# Patient Record
Sex: FEMALE | Race: BLACK OR AFRICAN AMERICAN | NOT HISPANIC OR LATINO
[De-identification: names, ages, dates, MRNs, and addresses within clinical notes are randomized per-mention and may not be internally consistent; named-entity substitution may affect disease eponyms.]

---

## 2017-07-10 ENCOUNTER — APPOINTMENT (OUTPATIENT)
Dept: PULMONOLOGY | Facility: CLINIC | Age: 52
End: 2017-07-10

## 2018-02-02 ENCOUNTER — APPOINTMENT (OUTPATIENT)
Dept: NEUROLOGY | Facility: CLINIC | Age: 53
End: 2018-02-02
Payer: MEDICAID

## 2018-02-02 VITALS
DIASTOLIC BLOOD PRESSURE: 80 MMHG | BODY MASS INDEX: 39.37 KG/M2 | WEIGHT: 245 LBS | SYSTOLIC BLOOD PRESSURE: 130 MMHG | HEIGHT: 66 IN

## 2018-02-02 DIAGNOSIS — Z87.19 PERSONAL HISTORY OF OTHER DISEASES OF THE DIGESTIVE SYSTEM: ICD-10-CM

## 2018-02-02 DIAGNOSIS — Z86.39 PERSONAL HISTORY OF OTHER ENDOCRINE, NUTRITIONAL AND METABOLIC DISEASE: ICD-10-CM

## 2018-02-02 DIAGNOSIS — F17.200 NICOTINE DEPENDENCE, UNSPECIFIED, UNCOMPLICATED: ICD-10-CM

## 2018-02-02 PROCEDURE — 99204 OFFICE O/P NEW MOD 45 MIN: CPT

## 2018-03-14 ENCOUNTER — APPOINTMENT (OUTPATIENT)
Dept: NEUROLOGY | Facility: CLINIC | Age: 53
End: 2018-03-14
Payer: MEDICAID

## 2018-03-14 VITALS
WEIGHT: 245 LBS | DIASTOLIC BLOOD PRESSURE: 68 MMHG | HEIGHT: 66 IN | BODY MASS INDEX: 39.37 KG/M2 | SYSTOLIC BLOOD PRESSURE: 120 MMHG

## 2018-03-14 PROCEDURE — 99214 OFFICE O/P EST MOD 30 MIN: CPT

## 2018-09-12 ENCOUNTER — APPOINTMENT (OUTPATIENT)
Dept: NEUROLOGY | Facility: CLINIC | Age: 53
End: 2018-09-12

## 2018-11-07 ENCOUNTER — APPOINTMENT (OUTPATIENT)
Dept: NEUROLOGY | Facility: CLINIC | Age: 53
End: 2018-11-07
Payer: MEDICAID

## 2018-11-07 VITALS
DIASTOLIC BLOOD PRESSURE: 68 MMHG | HEIGHT: 66 IN | BODY MASS INDEX: 35.36 KG/M2 | WEIGHT: 220 LBS | SYSTOLIC BLOOD PRESSURE: 128 MMHG

## 2018-11-07 PROCEDURE — 99213 OFFICE O/P EST LOW 20 MIN: CPT

## 2018-11-07 NOTE — ASSESSMENT
[FreeTextEntry1] : This is a 53-year-old woman with a history of lower back pains. She has seen a spinal specialist for this.\par \par She also has symptoms of carpal tunnel syndrome for which she has been wearing wrist braces at night.\par She has seen an orthopedist for this and reports that she was told that surgery was an option if the pain becomes unbearable.\par \par She also has some mild findings of neuropathy on nerve conduction studies.\par I advised her to maintain good blood sugar control. She reports that her last glycohemoglobin was 5.5.\par \par I will see her back in 6 months.

## 2018-11-07 NOTE — HISTORY OF PRESENT ILLNESS
[FreeTextEntry1] : I saw this patient in the office today.\par \par As you recall, she described a long history of lower back pain and sciatica on the right side. \par This has been going on for many years. It had become worse and began to radiate down the right leg into the foot.\par It is quite severe.\par It is associated with some numbness and tingling in the right leg.\par She reports that occasionally she will notice improvement if she changes position.\par She reports that she went for pain management injection and has had good relief.\par \par She also has had symptoms of carpal tunnel syndrome in both hands for the past several years. This had been somewhat worse and she has seen an orthopedic specialist for it.\par \par She was found to have neuropathy on nerve conduction study.\par \par

## 2018-11-07 NOTE — PHYSICAL EXAM
[General Appearance - Alert] : alert [Oriented To Time, Place, And Person] : oriented to person, place, and time [Memory Recent] : recent memory was not impaired [Memory Remote] : remote memory was not impaired [Cranial Nerves Optic (II)] : visual acuity intact bilaterally,  visual fields full to confrontation, pupils equal round and reactive to light [Cranial Nerves Oculomotor (III)] : extraocular motion intact [Cranial Nerves Trigeminal (V)] : facial sensation intact symmetrically [Cranial Nerves Facial (VII)] : face symmetrical [Cranial Nerves Vestibulocochlear (VIII)] : hearing was intact bilaterally [Cranial Nerves Glossopharyngeal (IX)] : tongue and palate midline [Cranial Nerves Accessory (XI - Cranial And Spinal)] : head turning and shoulder shrug symmetric [Cranial Nerves Hypoglossal (XII)] : there was no tongue deviation with protrusion [Motor Tone] : muscle tone was normal in all four extremities [Motor Strength] : muscle strength was normal in all four extremities [Sensation Tactile Decrease] : light touch was intact [Sensation Pain / Temperature Decrease] : pain and temperature was intact [Sensation Vibration Decrease] : vibration was intact [Abnormal Walk] : normal gait [1+] : Patella left 1+ [Aphasia] : no dysphasia/aphasia [Romberg's Sign] : Romberg's sign was negtive [Coordination - Dysmetria Impaired Finger-to-Nose Bilateral] : not present [Plantar Reflex Right Only] : normal on the right [Plantar Reflex Left Only] : normal on the left [FreeTextEntry8] : Tinel sign is present at the wrists bilaterally. [FreeTextEntry1] : There is some lumbar paraspinal tenderness bilaterally.

## 2018-11-07 NOTE — DATA REVIEWED
[de-identified] : Nerve conduction studies demonstrated some peripheral neuropathy likely secondary to her diabetes.\par \par MRI of the lumbar spine was performed at [facility] on 2/5/18. \par The study demonstrated multilevel degenerative changes and disc bulges.\par

## 2018-11-07 NOTE — REASON FOR VISIT
[Follow-Up: _____] : a [unfilled] follow-up visit [FreeTextEntry1] : CC: lower back pain and carpal tunnel syndrome

## 2018-11-07 NOTE — CONSULT LETTER
[Dear  ___] : Dear  [unfilled], [Courtesy Letter:] : I had the pleasure of seeing your patient, [unfilled], in my office today. [Please see my note below.] : Please see my note below. [Consult Closing:] : Thank you very much for allowing me to participate in the care of this patient.  If you have any questions, please do not hesitate to contact me. [Sincerely,] : Sincerely, [FreeTextEntry3] : Joshua Ferraro MD.

## 2018-11-07 NOTE — REVIEW OF SYSTEMS
[As Noted in HPI] : as noted in HPI [Palpitations] : palpitations [Joint Pain] : joint pain [Negative] : Heme/Lymph

## 2019-05-08 ENCOUNTER — APPOINTMENT (OUTPATIENT)
Dept: NEUROLOGY | Facility: CLINIC | Age: 54
End: 2019-05-08
Payer: MEDICAID

## 2019-05-08 VITALS
WEIGHT: 220 LBS | HEIGHT: 66 IN | SYSTOLIC BLOOD PRESSURE: 120 MMHG | BODY MASS INDEX: 35.36 KG/M2 | DIASTOLIC BLOOD PRESSURE: 65 MMHG

## 2019-05-08 PROCEDURE — 99214 OFFICE O/P EST MOD 30 MIN: CPT

## 2019-05-08 NOTE — ASSESSMENT
[FreeTextEntry1] : This is a 53 year-old woman with a history of lower back pains. She has seen a spinal specialist for this.\par \par She also has symptoms of carpal tunnel syndrome for which she has been wearing wrist braces at night.\par She has seen an orthopedist for this and reports that she was told that surgery was an option if the pain becomes unbearable.\par \par She also has some mild findings of neuropathy on nerve conduction studies.\par I advised her to maintain good blood sugar control. \par \par She had an episode of syncope and a small lacunar infarct was seen on brain MRI.\par Likely this is secondary to small vessel disease.\par I would recommend she continue antiplatelet statin medication.\par \par I will see her back in 6 months.

## 2019-05-08 NOTE — HISTORY OF PRESENT ILLNESS
[FreeTextEntry1] : I saw this patient in the office today.\par \par As you recall, she described a long history of lower back pain and sciatica on the right side. \par This has been going on for many years. It had become worse and began to radiate down the right leg into the foot.\par It is quite severe.\par It is associated with some numbness and tingling in the right leg.\par She reports that occasionally she will notice improvement if she changes position.\par She reports that she went for pain management injection and has had good relief.\par \par She also has had symptoms of carpal tunnel syndrome in both hands for the past several years. This had been somewhat worse and she has seen an orthopedic specialist for it.\par \par She was found to have neuropathy on nerve conduction study.\par \par She now reports that in early January of 2019 she passed out briefly.\par She has had no further episodes.\par She was sent for an MRI of the brain.\par She was noted to have a small lacunar infarct that appeared chronic.

## 2019-05-08 NOTE — DATA REVIEWED
[de-identified] : Brain MRI was performed on 2/13/19 at [facility].\par The study demonstrated a chronic lacunar infarct in the left superior frontal region.\par There is also some chronic ischemic change. [de-identified] : Nerve conduction studies demonstrated some peripheral neuropathy likely secondary to her diabetes.\par \par MRI of the lumbar spine was performed at [facility] on 2/5/18. \par The study demonstrated multilevel degenerative changes and disc bulges.\par

## 2019-05-08 NOTE — PHYSICAL EXAM
[General Appearance - In No Acute Distress] : in no acute distress [General Appearance - Alert] : alert [Oriented To Time, Place, And Person] : oriented to person, place, and time [Affect] : the affect was normal [Memory Recent] : recent memory was not impaired [Memory Remote] : remote memory was not impaired [Cranial Nerves Optic (II)] : visual acuity intact bilaterally,  visual fields full to confrontation, pupils equal round and reactive to light [Cranial Nerves Oculomotor (III)] : extraocular motion intact [Cranial Nerves Trigeminal (V)] : facial sensation intact symmetrically [Cranial Nerves Vestibulocochlear (VIII)] : hearing was intact bilaterally [Cranial Nerves Facial (VII)] : face symmetrical [Cranial Nerves Accessory (XI - Cranial And Spinal)] : head turning and shoulder shrug symmetric [Cranial Nerves Glossopharyngeal (IX)] : tongue and palate midline [Cranial Nerves Hypoglossal (XII)] : there was no tongue deviation with protrusion [Motor Strength] : muscle strength was normal in all four extremities [Motor Tone] : muscle tone was normal in all four extremities [Sensation Pain / Temperature Decrease] : pain and temperature was intact [Sensation Tactile Decrease] : light touch was intact [Abnormal Walk] : normal gait [Sensation Vibration Decrease] : vibration was intact [1+] : Patella left 1+ [Optic Disc Abnormality] : the optic disc were normal in size and color [Edema] : there was no peripheral edema [Involuntary Movements] : no involuntary movements were seen [Dysarthria] : no dysarthria [Aphasia] : no dysphasia/aphasia [Romberg's Sign] : Romberg's sign was negtive [Plantar Reflex Right Only] : normal on the right [Coordination - Dysmetria Impaired Finger-to-Nose Bilateral] : not present [Plantar Reflex Left Only] : normal on the left [FreeTextEntry1] : Tinel sign is present at the wrists bilaterally.

## 2019-11-13 ENCOUNTER — APPOINTMENT (OUTPATIENT)
Dept: NEUROLOGY | Facility: CLINIC | Age: 54
End: 2019-11-13
Payer: MEDICAID

## 2019-11-13 VITALS
HEIGHT: 66 IN | DIASTOLIC BLOOD PRESSURE: 78 MMHG | WEIGHT: 218 LBS | SYSTOLIC BLOOD PRESSURE: 142 MMHG | BODY MASS INDEX: 35.03 KG/M2

## 2019-11-13 PROCEDURE — 99214 OFFICE O/P EST MOD 30 MIN: CPT

## 2019-11-13 NOTE — PHYSICAL EXAM
[General Appearance - In No Acute Distress] : in no acute distress [General Appearance - Alert] : alert [Oriented To Time, Place, And Person] : oriented to person, place, and time [Affect] : the affect was normal [Memory Recent] : recent memory was not impaired [Memory Remote] : remote memory was not impaired [Cranial Nerves Optic (II)] : visual acuity intact bilaterally,  visual fields full to confrontation, pupils equal round and reactive to light [Cranial Nerves Oculomotor (III)] : extraocular motion intact [Cranial Nerves Trigeminal (V)] : facial sensation intact symmetrically [Cranial Nerves Facial (VII)] : face symmetrical [Cranial Nerves Vestibulocochlear (VIII)] : hearing was intact bilaterally [Cranial Nerves Glossopharyngeal (IX)] : tongue and palate midline [Motor Strength] : muscle strength was normal in all four extremities [Cranial Nerves Hypoglossal (XII)] : there was no tongue deviation with protrusion [Cranial Nerves Accessory (XI - Cranial And Spinal)] : head turning and shoulder shrug symmetric [Motor Tone] : muscle tone was normal in all four extremities [Sensation Tactile Decrease] : light touch was intact [Sensation Vibration Decrease] : vibration was intact [Sensation Pain / Temperature Decrease] : pain and temperature was intact [Abnormal Walk] : normal gait [1+] : Patella left 1+ [Optic Disc Abnormality] : the optic disc were normal in size and color [Edema] : there was no peripheral edema [Involuntary Movements] : no involuntary movements were seen [Dysarthria] : no dysarthria [Aphasia] : no dysphasia/aphasia [Romberg's Sign] : Romberg's sign was negtive [Coordination - Dysmetria Impaired Finger-to-Nose Bilateral] : not present [Plantar Reflex Right Only] : normal on the right [Plantar Reflex Left Only] : normal on the left [FreeTextEntry1] : Tinel sign is present at the wrists bilaterally.

## 2019-11-13 NOTE — HISTORY OF PRESENT ILLNESS
[FreeTextEntry1] : I saw this patient in the office today.\par \par As you recall, she described a long history of lower back pain and sciatica on the right side. \par This has been going on for many years. It had become worse and began to radiate down the right leg into the foot.\par It is quite severe.\par It is associated with some numbness and tingling in the right leg.\par She reports that occasionally she will notice improvement if she changes position.\par She reports that she went for pain management injection and has had good relief.\par \par She also has had symptoms of carpal tunnel syndrome in both hands for the past several years. This had been somewhat worse and she has seen an orthopedic specialist for it.\par \par She was found to have neuropathy on nerve conduction study.\par \par She now reports that in early January of 2019 she passed out briefly.\par She has had no further episodes.\par She was sent for an MRI of the brain.\par She was noted to have a small lacunar infarct that appeared chronic.\par \par \par

## 2019-11-13 NOTE — DATA REVIEWED
[de-identified] : Brain MRI was performed on 2/13/19 at [facility].\par The study demonstrated a chronic lacunar infarct in the left superior frontal region.\par There is also some chronic ischemic change. [de-identified] : Nerve conduction studies demonstrated some peripheral neuropathy likely secondary to her diabetes.\par \par MRI of the lumbar spine was performed at [facility] on 2/5/18. \par The study demonstrated multilevel degenerative changes and disc bulges.\par

## 2019-11-13 NOTE — ASSESSMENT
[FreeTextEntry1] : This is a 54 year-old woman with a history of lower back pains. She has seen a spinal specialist for this.\par \par She also has symptoms of carpal tunnel syndrome for which she has been wearing wrist braces at night.\par She has seen an orthopedist for this and reports that she was told that surgery was an option if the pain becomes unbearable.\par \par She also has some mild findings of neuropathy on nerve conduction studies.\par I advised her to maintain good blood sugar control. \par \par She had an episode of syncope and a small lacunar infarct was seen on brain MRI.\par Likely this is secondary to small vessel disease.\par I would recommend she continue antiplatelet statin medication.\par \par I will see her back in 6 months.

## 2020-05-13 ENCOUNTER — APPOINTMENT (OUTPATIENT)
Dept: NEUROLOGY | Facility: CLINIC | Age: 55
End: 2020-05-13

## 2020-07-30 ENCOUNTER — APPOINTMENT (OUTPATIENT)
Dept: NEUROLOGY | Facility: CLINIC | Age: 55
End: 2020-07-30

## 2020-08-14 ENCOUNTER — APPOINTMENT (OUTPATIENT)
Dept: NEUROLOGY | Facility: CLINIC | Age: 55
End: 2020-08-14
Payer: COMMERCIAL

## 2020-08-14 VITALS
WEIGHT: 220 LBS | TEMPERATURE: 97.7 F | BODY MASS INDEX: 35.36 KG/M2 | HEIGHT: 66 IN | DIASTOLIC BLOOD PRESSURE: 70 MMHG | SYSTOLIC BLOOD PRESSURE: 126 MMHG

## 2020-08-14 PROCEDURE — 99213 OFFICE O/P EST LOW 20 MIN: CPT

## 2020-08-14 RX ORDER — SITAGLIPTIN 100 MG/1
100 TABLET, FILM COATED ORAL
Refills: 0 | Status: DISCONTINUED | COMMUNITY
End: 2020-08-14

## 2020-08-14 NOTE — REVIEW OF SYSTEMS
[As Noted in HPI] : as noted in HPI [Joint Pain] : joint pain [Palpitations] : palpitations [Negative] : Heme/Lymph

## 2020-08-14 NOTE — CONSULT LETTER
[Courtesy Letter:] : I had the pleasure of seeing your patient, [unfilled], in my office today. [Dear  ___] : Dear  [unfilled], [Consult Closing:] : Thank you very much for allowing me to participate in the care of this patient.  If you have any questions, please do not hesitate to contact me. [Sincerely,] : Sincerely, [Please see my note below.] : Please see my note below. [FreeTextEntry3] : Joshua Ferraro MD.

## 2020-08-14 NOTE — DATA REVIEWED
[de-identified] : Brain MRI was performed on 2/13/19 at [facility].\par The study demonstrated a chronic lacunar infarct in the left superior frontal region.\par There is also some chronic ischemic change. [de-identified] : Nerve conduction studies demonstrated some peripheral neuropathy likely secondary to her diabetes.\par \par MRI of the lumbar spine was performed at [facility] on 2/5/18. \par The study demonstrated multilevel degenerative changes and disc bulges.\par

## 2020-08-14 NOTE — PHYSICAL EXAM
[General Appearance - Alert] : alert [General Appearance - In No Acute Distress] : in no acute distress [Affect] : the affect was normal [Oriented To Time, Place, And Person] : oriented to person, place, and time [Memory Recent] : recent memory was not impaired [Memory Remote] : remote memory was not impaired [Cranial Nerves Optic (II)] : visual acuity intact bilaterally,  visual fields full to confrontation, pupils equal round and reactive to light [Cranial Nerves Oculomotor (III)] : extraocular motion intact [Cranial Nerves Trigeminal (V)] : facial sensation intact symmetrically [Cranial Nerves Facial (VII)] : face symmetrical [Cranial Nerves Vestibulocochlear (VIII)] : hearing was intact bilaterally [Cranial Nerves Accessory (XI - Cranial And Spinal)] : head turning and shoulder shrug symmetric [Cranial Nerves Glossopharyngeal (IX)] : tongue and palate midline [Cranial Nerves Hypoglossal (XII)] : there was no tongue deviation with protrusion [Motor Strength] : muscle strength was normal in all four extremities [Motor Tone] : muscle tone was normal in all four extremities [Sensation Tactile Decrease] : light touch was intact [Sensation Vibration Decrease] : vibration was intact [Sensation Pain / Temperature Decrease] : pain and temperature was intact [Abnormal Walk] : normal gait [1+] : Patella left 1+ [Edema] : there was no peripheral edema [Optic Disc Abnormality] : the optic disc were normal in size and color [Involuntary Movements] : no involuntary movements were seen [Dysarthria] : no dysarthria [Aphasia] : no dysphasia/aphasia [Romberg's Sign] : Romberg's sign was negtive [Coordination - Dysmetria Impaired Finger-to-Nose Bilateral] : not present [Plantar Reflex Right Only] : normal on the right [Plantar Reflex Left Only] : normal on the left [FreeTextEntry1] : Tinel sign is present at the wrists bilaterally.

## 2020-08-14 NOTE — ASSESSMENT
[FreeTextEntry1] : This is a 55 year-old woman with a history of lower back pains. She has seen a spinal specialist for this.\par \par She also has symptoms of carpal tunnel syndrome for which she has been wearing wrist braces at night.\par She has seen an orthopedist for this and reports that she was told that surgery was an option if the pain becomes unbearable.\par \par She also has some mild findings of neuropathy on nerve conduction studies.\par I advised her to maintain good blood sugar control. \par \par She had an episode of syncope and a small lacunar infarct was seen on brain MRI.\par Likely this is secondary to small vessel disease.\par I would recommend she continue antiplatelet statin medication.\par \par I will see her back in 6 months.\par \par

## 2020-11-06 ENCOUNTER — APPOINTMENT (OUTPATIENT)
Dept: INTERNAL MEDICINE | Facility: CLINIC | Age: 55
End: 2020-11-06
Payer: COMMERCIAL

## 2020-11-06 VITALS
WEIGHT: 218 LBS | HEIGHT: 66 IN | OXYGEN SATURATION: 98 % | RESPIRATION RATE: 14 BRPM | TEMPERATURE: 97.3 F | BODY MASS INDEX: 35.03 KG/M2 | SYSTOLIC BLOOD PRESSURE: 128 MMHG | HEART RATE: 96 BPM | DIASTOLIC BLOOD PRESSURE: 70 MMHG

## 2020-11-06 DIAGNOSIS — E78.1 PURE HYPERGLYCERIDEMIA: ICD-10-CM

## 2020-11-06 PROCEDURE — 99203 OFFICE O/P NEW LOW 30 MIN: CPT

## 2020-11-06 PROCEDURE — 99072 ADDL SUPL MATRL&STAF TM PHE: CPT

## 2020-11-06 RX ORDER — MULTIVITAMIN
TABLET ORAL
Refills: 0 | Status: DISCONTINUED | COMMUNITY
End: 2020-11-06

## 2020-11-06 RX ORDER — ASPIRIN ENTERIC COATED TABLETS 81 MG 81 MG/1
81 TABLET, DELAYED RELEASE ORAL
Refills: 0 | Status: ACTIVE | COMMUNITY

## 2020-11-06 RX ORDER — CHOLECALCIFEROL (VITAMIN D3) 50 MCG
50 MCG CAPSULE ORAL
Qty: 90 | Refills: 0 | Status: ACTIVE | COMMUNITY
Start: 1900-01-01 | End: 1900-01-01

## 2020-11-06 NOTE — HISTORY OF PRESENT ILLNESS
[FreeTextEntry1] : HTN [de-identified] : -PMH: T2DM, HTN, HLD, GERD, Asthma, H/o TIA\par -SH: . Employed. Current smoker. Occasional EtOH use. \par \par ROXI is a 55 year F whom is here today for f/u HTN and to establish care w/ a new PMD\par Today, pt reports feeling well and is w/o complaints. \par She denies any changes since followup with her previous primary care doctor.\par She had recent labs done on 11/1/20. This could include an A1c of 5.6. Her ALT was noted to be slightly elevated at 36\par \par Specialists Involved:\par -Cardio: Dr. Villafana ([phone])\par -OBGYN: Dr. Wu ([phone])\par -Podiatry: [phone])\par -GI: Dr. Woods\par -Neuro: Dr. Ferraro \par \par -T2DM: Remains on metformin 500mg in AM & 1000mg QPM & Alogliptin 25mg QD. (11/2020) A1c 5.6. No reported changes.\par \par -HTN: Remains on labetalol 100mg BID & Amlodipine 5mg. Follows w/ Cardio. No reported changes\par -HLD & HyperTG: Remains on Atorvastatin 40mg HS and Ezetimibe 10mg. No reported changes.\par \par -GERD: Remains on Pantoprazole 40mg. Last EGD ~2yrs ago. No reported changes\par -Asthma: Remains on Montelukast 10mh QD. No reported changes.

## 2020-11-06 NOTE — ASSESSMENT
[FreeTextEntry1] : -PMH: T2DM, HTN, HLD, GERD, Asthma, H/o TIA\par -SH: . Employed. Current smoker. Occasional EtOH use. \par \par ROXI is a 55 year F whom is here today for f/u HTN and to establish care w/ a new PMD\par \par Specialists Involved:\par -Cardio: Dr. Villafana ([phone])\par -OBGYN: Dr. Wu ([phone])\par -Podiatry: [phone])\par -GI: Dr. Woods\par -Neuro: Dr. Ferraro \par \par -F/u labs drawn in office today\par -Further recs pending lab results\par -RTO 3mo for routine f/u & labs

## 2021-01-04 RX ORDER — PANTOPRAZOLE SODIUM 40 MG/10ML
40 INJECTION, POWDER, FOR SOLUTION INTRAVENOUS
Refills: 0 | Status: DISCONTINUED | COMMUNITY
End: 2021-01-04

## 2021-02-04 ENCOUNTER — NON-APPOINTMENT (OUTPATIENT)
Age: 56
End: 2021-02-04

## 2021-02-15 ENCOUNTER — APPOINTMENT (OUTPATIENT)
Dept: INTERNAL MEDICINE | Facility: CLINIC | Age: 56
End: 2021-02-15
Payer: COMMERCIAL

## 2021-02-15 VITALS
BODY MASS INDEX: 34.39 KG/M2 | TEMPERATURE: 96.8 F | HEART RATE: 91 BPM | DIASTOLIC BLOOD PRESSURE: 68 MMHG | HEIGHT: 66 IN | SYSTOLIC BLOOD PRESSURE: 126 MMHG | WEIGHT: 214 LBS | OXYGEN SATURATION: 98 % | RESPIRATION RATE: 14 BRPM

## 2021-02-15 PROCEDURE — 99072 ADDL SUPL MATRL&STAF TM PHE: CPT

## 2021-02-15 PROCEDURE — 99214 OFFICE O/P EST MOD 30 MIN: CPT | Mod: 25

## 2021-02-15 RX ORDER — METFORMIN ER 500 MG 500 MG/1
500 TABLET ORAL
Qty: 90 | Refills: 0 | Status: DISCONTINUED | COMMUNITY
Start: 2020-10-14 | End: 2021-02-15

## 2021-02-15 RX ORDER — HYDROCODONE BITARTRATE AND ACETAMINOPHEN 5; 325 MG/1; MG/1
5-325 TABLET ORAL
Qty: 15 | Refills: 0 | Status: DISCONTINUED | COMMUNITY
Start: 2021-02-02

## 2021-02-15 RX ORDER — IBUPROFEN 600 MG/1
600 TABLET, FILM COATED ORAL
Qty: 24 | Refills: 0 | Status: DISCONTINUED | COMMUNITY
Start: 2020-08-25

## 2021-02-15 RX ORDER — METFORMIN HYDROCHLORIDE 500 MG/1
500 TABLET, COATED ORAL
Qty: 270 | Refills: 0 | Status: DISCONTINUED | COMMUNITY
Start: 1900-01-01 | End: 2021-02-15

## 2021-02-15 RX ORDER — CLINDAMYCIN HYDROCHLORIDE 150 MG/1
150 CAPSULE ORAL
Qty: 28 | Refills: 0 | Status: DISCONTINUED | COMMUNITY
Start: 2020-08-25

## 2021-02-16 LAB
ALBUMIN SERPL ELPH-MCNC: 4.5 G/DL
ALP BLD-CCNC: 106 U/L
ALT SERPL-CCNC: 31 U/L
ANION GAP SERPL CALC-SCNC: 11 MMOL/L
AST SERPL-CCNC: 26 U/L
BILIRUB SERPL-MCNC: 0.2 MG/DL
BUN SERPL-MCNC: 10 MG/DL
CALCIUM SERPL-MCNC: 10 MG/DL
CHLORIDE SERPL-SCNC: 105 MMOL/L
CHOLEST SERPL-MCNC: 142 MG/DL
CO2 SERPL-SCNC: 24 MMOL/L
CREAT SERPL-MCNC: 0.87 MG/DL
CREAT SPEC-SCNC: 42 MG/DL
ESTIMATED AVERAGE GLUCOSE: 114 MG/DL
FOLATE SERPL-MCNC: >20 NG/ML
GLUCOSE SERPL-MCNC: 110 MG/DL
HBA1C MFR BLD HPLC: 5.6 %
HDLC SERPL-MCNC: 34 MG/DL
LDLC SERPL CALC-MCNC: 75 MG/DL
MICROALBUMIN 24H UR DL<=1MG/L-MCNC: 1.6 MG/DL
MICROALBUMIN/CREAT 24H UR-RTO: 38 MG/G
NONHDLC SERPL-MCNC: 108 MG/DL
POTASSIUM SERPL-SCNC: 4.9 MMOL/L
PROT SERPL-MCNC: 7.3 G/DL
SODIUM SERPL-SCNC: 140 MMOL/L
TRIGL SERPL-MCNC: 162 MG/DL
VIT B12 SERPL-MCNC: >2000 PG/ML

## 2021-02-16 NOTE — HISTORY OF PRESENT ILLNESS
[FreeTextEntry1] : T2DM [de-identified] : -PMH: T2DM, HTN, HLD, GERD, Asthma, H/o TIA\par -SH: . Employed. Current smoker. Occasional EtOH use. \par \par ROXI is a 55 year F whom is here today for f/u HTN and T2DM\par Today, pt reports feeling well but mentions that she does have some episodes of Dizziness that occurs episodically. \par She denies any changes since followup visit\par \par -T2DM: Remains on metformin 500mg in AM & 1000mg QPM & Alogliptin 25mg QD. (11/2020) A1c 5.6. No reported changes.\par \par -HTN: Remains on labetalol 100mg BID & Amlodipine 5mg. Follows w/ Cardio. No reported changes\par -HLD & HyperTG: Remains on Atorvastatin 40mg HS and Ezetimibe 10mg. No reported changes.\par \par -GERD: Remains on Pantoprazole 40mg. Last EGD ~2yrs ago. No reported changes\par -Asthma: Remains on Montelukast 10mh QD. No reported changes.

## 2021-02-16 NOTE — HISTORY OF PRESENT ILLNESS
[FreeTextEntry1] : T2DM [de-identified] : -PMH: T2DM, HTN, HLD, GERD, Asthma, H/o TIA\par -SH: . Employed. Current smoker. Occasional EtOH use. \par \par ROXI is a 55 year F whom is here today for f/u HTN and T2DM\par Today, pt reports feeling well but mentions that she does have some episodes of Dizziness that occurs episodically. \par She denies any changes since followup visit\par \par -T2DM: Remains on metformin 500mg in AM & 1000mg QPM & Alogliptin 25mg QD. (11/2020) A1c 5.6. No reported changes.\par \par -HTN: Remains on labetalol 100mg BID & Amlodipine 5mg. Follows w/ Cardio. No reported changes\par -HLD & HyperTG: Remains on Atorvastatin 40mg HS and Ezetimibe 10mg. No reported changes.\par \par -GERD: Remains on Pantoprazole 40mg. Last EGD ~2yrs ago. No reported changes\par -Asthma: Remains on Montelukast 10mh QD. No reported changes.

## 2021-02-16 NOTE — ASSESSMENT
[FreeTextEntry1] : -PMH: T2DM, HTN, HLD, GERD, Asthma, H/o TIA\par -SH: . Employed. Current smoker. Occasional EtOH use. \par \par ROXI is a 55 year F whom is here today for f/u HTN and T2DM\par \par Specialists Involved:\par -Cardio: Dr. Villafana ([phone])\par -OBGYN: Dr. Wu ([phone])\par -Podiatry: [phone])\par -GI: Dr. Woods\par -Neuro: Dr. Ferraro\par \par -F/u labs drawn in office today\par -Further recs pending lab results\par -RTO 3mo for routine f/u & labs

## 2021-02-16 NOTE — ASSESSMENT
[FreeTextEntry1] : -PMH: T2DM, HTN, HLD, GERD, Asthma, H/o TIA\par -SH: . Employed. Current smoker. Occasional EtOH use. \par \par ROXI is a 55 year F whom is here today for f/u HTN and T2DM\par \par Specialists Involved:\par -Cardio: Dr. Villafana ([phone])\par -OBGYN: Dr. Wu ([phone])\par -Podiatry: [phone])\par -GI: Dr. Woods\par -Neuro: Dr. Ferraro\par \par -F/u labs drawn in office today\par -Further recs pending lab results\par -RTO 3mo for routine f/u & labs

## 2021-02-16 NOTE — ADDENDUM
[FreeTextEntry1] : CMP WNL\par A1c 5.6\par Lipid panel WNL\par Urine microalbumin 38\par \par #1 type 2 diabetes well controlled on metformin and allogliptin. Recommend increasing metformin up to 750 mg extended release b.i.d. from 500 ER b.i.d. Also recommend DCing Allogliptin for now and monitoring glucose levels. If fasting levels begin to increase over 120-130, will place back on allogliptin but at lower dose \par #2 microscopic proteinuria: Will repeat urine microalbumin at next visit

## 2021-02-17 ENCOUNTER — APPOINTMENT (OUTPATIENT)
Dept: NEUROLOGY | Facility: CLINIC | Age: 56
End: 2021-02-17
Payer: COMMERCIAL

## 2021-02-17 VITALS
HEIGHT: 66 IN | TEMPERATURE: 97.3 F | SYSTOLIC BLOOD PRESSURE: 144 MMHG | WEIGHT: 214 LBS | BODY MASS INDEX: 34.39 KG/M2 | DIASTOLIC BLOOD PRESSURE: 70 MMHG

## 2021-02-17 PROCEDURE — 99214 OFFICE O/P EST MOD 30 MIN: CPT

## 2021-02-17 PROCEDURE — 99072 ADDL SUPL MATRL&STAF TM PHE: CPT

## 2021-02-17 RX ORDER — ALOGLIPTIN 25 MG/1
25 TABLET, FILM COATED ORAL DAILY
Qty: 90 | Refills: 0 | Status: DISCONTINUED | COMMUNITY
Start: 2020-11-06 | End: 2021-02-17

## 2021-02-17 RX ORDER — METFORMIN ER 500 MG 500 MG/1
500 TABLET ORAL
Qty: 180 | Refills: 0 | Status: DISCONTINUED | COMMUNITY
Start: 2021-02-15 | End: 2021-02-17

## 2021-02-19 NOTE — DATA REVIEWED
[de-identified] : Brain MRI was performed on 2/13/19 at [facility].\par The study demonstrated a chronic lacunar infarct in the left superior frontal region.\par There is also some chronic ischemic change. [de-identified] : Nerve conduction studies demonstrated some peripheral neuropathy likely secondary to her diabetes.\par \par MRI of the lumbar spine was performed at [facility] on 2/5/18. \par The study demonstrated multilevel degenerative changes and disc bulges.\par

## 2021-02-19 NOTE — HISTORY OF PRESENT ILLNESS
[FreeTextEntry1] : I saw this patient in the office today.\par \par As you recall, she described a long history of lower back pain and sciatica on the right side. \par This has been going on for many years. It had become worse and began to radiate down the right leg into the foot.\par It is quite severe.\par It is associated with some numbness and tingling in the right leg.\par She reports that occasionally she will notice improvement if she changes position.\par She reports that she went for pain management injection and has had good relief.\par \par She also has had symptoms of carpal tunnel syndrome in both hands for the past several years. This had been somewhat worse and she has seen an orthopedic specialist for it.\par \par She was found to have neuropathy on nerve conduction study.\par \par She now reports that in early January of 2019 she passed out briefly.\par She has had no further episodes.\par She was sent for an MRI of the brain.\par She was noted to have a small lacunar infarct that appeared chronic.\par \par She describes occasional dizzy spells when she turns her head.\par

## 2021-02-19 NOTE — ASSESSMENT
[FreeTextEntry1] : This is a 55 year-old woman with a history of lower back pains. She has seen a spinal specialist for this.\par \par She also has symptoms of carpal tunnel syndrome for which she has been wearing wrist braces at night.\par She has seen an orthopedist for this and reports that she was told that surgery was an option if the pain becomes unbearable.\par \par She also has some mild findings of neuropathy on nerve conduction studies.\par I advised her to maintain good blood sugar control. \par \par She had an episode of syncope and a small lacunar infarct was seen on brain MRI.\par Likely this is secondary to small vessel disease.\par I would recommend she continue antiplatelet statin medication.\par \par I have given her some simple exercises she could do at home for the dizziness.\par \par I will see her back in 6 months.\par \par

## 2021-02-19 NOTE — PHYSICAL EXAM
[General Appearance - Alert] : alert [General Appearance - In No Acute Distress] : in no acute distress [Oriented To Time, Place, And Person] : oriented to person, place, and time [Affect] : the affect was normal [Memory Recent] : recent memory was not impaired [Memory Remote] : remote memory was not impaired [Cranial Nerves Optic (II)] : visual acuity intact bilaterally,  visual fields full to confrontation, pupils equal round and reactive to light [Cranial Nerves Oculomotor (III)] : extraocular motion intact [Cranial Nerves Trigeminal (V)] : facial sensation intact symmetrically [Cranial Nerves Facial (VII)] : face symmetrical [Cranial Nerves Vestibulocochlear (VIII)] : hearing was intact bilaterally [Cranial Nerves Glossopharyngeal (IX)] : tongue and palate midline [Cranial Nerves Accessory (XI - Cranial And Spinal)] : head turning and shoulder shrug symmetric [Cranial Nerves Hypoglossal (XII)] : there was no tongue deviation with protrusion [Motor Tone] : muscle tone was normal in all four extremities [Motor Strength] : muscle strength was normal in all four extremities [Sensation Tactile Decrease] : light touch was intact [Sensation Pain / Temperature Decrease] : pain and temperature was intact [Sensation Vibration Decrease] : vibration was intact [Abnormal Walk] : normal gait [1+] : Patella left 1+ [Optic Disc Abnormality] : the optic disc were normal in size and color [Edema] : there was no peripheral edema [Involuntary Movements] : no involuntary movements were seen [Aphasia] : no dysphasia/aphasia [Dysarthria] : no dysarthria [Romberg's Sign] : Romberg's sign was negtive [Coordination - Dysmetria Impaired Finger-to-Nose Bilateral] : not present [Plantar Reflex Right Only] : normal on the right [Plantar Reflex Left Only] : normal on the left [FreeTextEntry1] : Tinel sign is present at the wrists bilaterally.

## 2021-02-19 NOTE — REASON FOR VISIT
[Follow-Up: _____] : a [unfilled] follow-up visit [FreeTextEntry1] : CC: Syncope, stroke, lower back pain and carpal tunnel syndrome

## 2021-02-19 NOTE — CONSULT LETTER
[Courtesy Letter:] : I had the pleasure of seeing your patient, [unfilled], in my office today. [Please see my note below.] : Please see my note below. [Consult Closing:] : Thank you very much for allowing me to participate in the care of this patient.  If you have any questions, please do not hesitate to contact me. [Sincerely,] : Sincerely, [Dear  ___] : Dear  [unfilled], [FreeTextEntry3] : Joshua Ferraro MD.

## 2021-05-17 ENCOUNTER — APPOINTMENT (OUTPATIENT)
Dept: INTERNAL MEDICINE | Facility: CLINIC | Age: 56
End: 2021-05-17
Payer: COMMERCIAL

## 2021-05-17 VITALS
HEIGHT: 66 IN | HEART RATE: 108 BPM | TEMPERATURE: 97.3 F | RESPIRATION RATE: 14 BRPM | WEIGHT: 222 LBS | BODY MASS INDEX: 35.68 KG/M2 | OXYGEN SATURATION: 98 %

## 2021-05-17 VITALS — DIASTOLIC BLOOD PRESSURE: 70 MMHG | SYSTOLIC BLOOD PRESSURE: 130 MMHG

## 2021-05-17 DIAGNOSIS — R74.0 NONSPECIFIC ELEVATION OF LEVELS OF TRANSAMINASE AND LACTIC ACID DEHYDROGENASE [LDH]: ICD-10-CM

## 2021-05-17 DIAGNOSIS — Z87.898 PERSONAL HISTORY OF OTHER SPECIFIED CONDITIONS: ICD-10-CM

## 2021-05-17 PROCEDURE — 99072 ADDL SUPL MATRL&STAF TM PHE: CPT

## 2021-05-17 PROCEDURE — 99214 OFFICE O/P EST MOD 30 MIN: CPT | Mod: 25

## 2021-05-17 RX ORDER — BUPROPION HYDROCHLORIDE 300 MG/1
300 TABLET, EXTENDED RELEASE ORAL
Qty: 90 | Refills: 0 | Status: DISCONTINUED | COMMUNITY
Start: 2021-03-05

## 2021-05-17 NOTE — ASSESSMENT
[FreeTextEntry1] : -PMH: T2DM, HTN, HLD, GERD, Asthma, H/o TIA\par -SH: . Employed. Current smoker. Occasional EtOH use. \par \par ROXI is a 55 year F whom is here today for f/u HTN and T2DM\par \par Specialists Involved:\par -Cardio: Dr. Villafana ([phone])\par -OBGYN: Dr. Wu ([phone])\par -Podiatry: [phone])\par -GI: Dr. Woods\par -Neuro: Dr. Ferraro\par \par -F/u labs drawn in office today\par -Further recs pending lab results\par -Continue f/u w/ Cardio (HTN, HLD)\par -Continue f/u w/ Neuro (Neuropathy & H/o TIA)\par -RTO 3mo for routine f/u & labs or sooner if needed

## 2021-05-17 NOTE — HISTORY OF PRESENT ILLNESS
[FreeTextEntry1] : T2DM & HTN [de-identified] : -PMH: T2DM, HTN, HLD, GERD, Asthma, H/o TIA\par -SH: . Employed. Current smoker. Occasional EtOH use. \par \par ROXI is a 55 year F whom is here today for f/u HTN and T2DM\par Today, pt reports feeling well and is w/o complaints\par She denies any changes since our last followup visit\par \par -T2DM: Remains on metformin 750mg BID. Now longer on Alogliptin 25mg QD. On Statin & Aspirin. Not checking her glucose levels. (2/2021) A1c 5.6. (12/2020) Optho Eval: No Retinopathy. No reported changes.\par \par -HTN: Remains on Labetalol 100mg BID & Amlodipine 5mg QD. Follows w/ Cardio. No reported changes\par -HLD & HyperTG: Remains on Atorvastatin 40mg HS and Ezetimibe 10mg. No reported changes.\par \par -GERD: Remains on Pantoprazole 40mg. Last EGD ~2yrs ago. No reported changes\par -Asthma: Remains on Montelukast 10mh QD. No reported changes. \par \par -Current Smoker: Smoking 1PPD. On Bupropion 300mg ER QD as per Cardio.

## 2021-05-18 ENCOUNTER — NON-APPOINTMENT (OUTPATIENT)
Age: 56
End: 2021-05-18

## 2021-05-18 DIAGNOSIS — R80.9 PROTEINURIA, UNSPECIFIED: ICD-10-CM

## 2021-05-18 LAB
CREAT SPEC-SCNC: 190 MG/DL
ESTIMATED AVERAGE GLUCOSE: 128 MG/DL
HBA1C MFR BLD HPLC: 6.1 %
MICROALBUMIN 24H UR DL<=1MG/L-MCNC: 4.8 MG/DL
MICROALBUMIN/CREAT 24H UR-RTO: 25 MG/G

## 2021-07-01 ENCOUNTER — APPOINTMENT (OUTPATIENT)
Dept: ORTHOPEDIC SURGERY | Facility: CLINIC | Age: 56
End: 2021-07-01
Payer: COMMERCIAL

## 2021-07-01 VITALS
WEIGHT: 219 LBS | HEART RATE: 85 BPM | BODY MASS INDEX: 35.2 KG/M2 | SYSTOLIC BLOOD PRESSURE: 148 MMHG | HEIGHT: 66 IN | DIASTOLIC BLOOD PRESSURE: 80 MMHG

## 2021-07-01 DIAGNOSIS — M25.512 PAIN IN LEFT SHOULDER: ICD-10-CM

## 2021-07-01 PROCEDURE — 99203 OFFICE O/P NEW LOW 30 MIN: CPT

## 2021-07-07 ENCOUNTER — APPOINTMENT (OUTPATIENT)
Dept: ORTHOPEDIC SURGERY | Facility: CLINIC | Age: 56
End: 2021-07-07
Payer: COMMERCIAL

## 2021-07-07 VITALS
WEIGHT: 219 LBS | SYSTOLIC BLOOD PRESSURE: 147 MMHG | BODY MASS INDEX: 35.2 KG/M2 | DIASTOLIC BLOOD PRESSURE: 81 MMHG | HEART RATE: 76 BPM | HEIGHT: 66 IN

## 2021-07-07 PROCEDURE — 99214 OFFICE O/P EST MOD 30 MIN: CPT

## 2021-08-02 ENCOUNTER — APPOINTMENT (OUTPATIENT)
Dept: ORTHOPEDIC SURGERY | Facility: CLINIC | Age: 56
End: 2021-08-02

## 2021-08-19 ENCOUNTER — APPOINTMENT (OUTPATIENT)
Dept: INTERNAL MEDICINE | Facility: CLINIC | Age: 56
End: 2021-08-19
Payer: COMMERCIAL

## 2021-08-19 ENCOUNTER — APPOINTMENT (OUTPATIENT)
Dept: NEUROLOGY | Facility: CLINIC | Age: 56
End: 2021-08-19
Payer: COMMERCIAL

## 2021-08-19 VITALS
BODY MASS INDEX: 35.2 KG/M2 | RESPIRATION RATE: 14 BRPM | OXYGEN SATURATION: 97 % | TEMPERATURE: 97.2 F | WEIGHT: 219 LBS | DIASTOLIC BLOOD PRESSURE: 60 MMHG | HEIGHT: 66 IN | SYSTOLIC BLOOD PRESSURE: 136 MMHG | HEART RATE: 88 BPM

## 2021-08-19 VITALS
DIASTOLIC BLOOD PRESSURE: 60 MMHG | SYSTOLIC BLOOD PRESSURE: 136 MMHG | HEIGHT: 66 IN | WEIGHT: 225 LBS | BODY MASS INDEX: 36.16 KG/M2

## 2021-08-19 LAB
ANION GAP SERPL CALC-SCNC: 11 MMOL/L
BUN SERPL-MCNC: 13 MG/DL
CALCIUM SERPL-MCNC: 10.1 MG/DL
CHLORIDE SERPL-SCNC: 105 MMOL/L
CHOLEST SERPL-MCNC: 147 MG/DL
CO2 SERPL-SCNC: 24 MMOL/L
CREAT SERPL-MCNC: 0.88 MG/DL
GLUCOSE SERPL-MCNC: 132 MG/DL
HDLC SERPL-MCNC: 31 MG/DL
LDLC SERPL CALC-MCNC: 85 MG/DL
NONHDLC SERPL-MCNC: 116 MG/DL
POTASSIUM SERPL-SCNC: 4.4 MMOL/L
SODIUM SERPL-SCNC: 140 MMOL/L
TRIGL SERPL-MCNC: 155 MG/DL

## 2021-08-19 PROCEDURE — 99214 OFFICE O/P EST MOD 30 MIN: CPT | Mod: 25

## 2021-08-19 PROCEDURE — 99214 OFFICE O/P EST MOD 30 MIN: CPT

## 2021-08-19 RX ORDER — METFORMIN HYDROCHLORIDE 1000 MG/1
1000 TABLET, EXTENDED RELEASE ORAL
Qty: 90 | Refills: 1 | Status: DISCONTINUED | COMMUNITY
Start: 2021-02-17 | End: 2021-08-19

## 2021-08-19 NOTE — ASSESSMENT
[FreeTextEntry1] : This is a 56 year-old woman with a history of lower back pains. She has seen a spinal specialist for this.\par \par She also has symptoms of carpal tunnel syndrome for which she has been wearing wrist braces at night.\par She has seen an orthopedist for this and reports that she was told that surgery was an option if the pain becomes unbearable.\par \par She also has some mild findings of neuropathy on nerve conduction studies.\par I advised her to maintain good blood sugar control. \par \par She had an episode of syncope and a small lacunar infarct was seen on brain MRI.\par Likely this is secondary to small vessel disease.\par I would recommend she continue antiplatelet statin medication.\par \par I have given her some simple exercises she could do at home for the dizziness.\par \par I have counseled her to discontinue smoking.\par \par I will see her back in 6 months.\par \par

## 2021-08-19 NOTE — DATA REVIEWED
[de-identified] : Brain MRI was performed on 2/13/19 at [facility].\par The study demonstrated a chronic lacunar infarct in the left superior frontal region.\par There is also some chronic ischemic change. [de-identified] : Nerve conduction studies demonstrated some peripheral neuropathy likely secondary to her diabetes.\par \par MRI of the lumbar spine was performed at [facility] on 2/5/18. \par The study demonstrated multilevel degenerative changes and disc bulges.\par

## 2021-08-19 NOTE — ASSESSMENT
[FreeTextEntry1] : -PMH: T2DM, HTN, HLD, GERD, Asthma, H/o TIA\par -SH: . Employed. Current smoker. Occasional EtOH use. \par \par ROXI is a 55 year F whom is here today for f/u HTN and T2DM\par \par Specialists Involved:\par -Cardio: Dr. Villafana ([phone])\par -OBGYN: Dr. Wu ([phone])\par -Podiatry: [phone])\par -GI: Dr. Woods\par -Neuro: Dr. Ferraro\par \par -F/u labs drawn in office today\par -Further recs pending lab results\par -Continue f/u w/ Cardio (HTN, HLD)\par -Continue f/u w/ Neuro (Neuropathy & H/o TIA)\par -RTO 3mo for CPE or sooner if needed

## 2021-08-19 NOTE — PHYSICAL EXAM
[General Appearance - Alert] : alert [General Appearance - In No Acute Distress] : in no acute distress [Oriented To Time, Place, And Person] : oriented to person, place, and time [Affect] : the affect was normal [Memory Recent] : recent memory was not impaired [Memory Remote] : remote memory was not impaired [Cranial Nerves Optic (II)] : visual acuity intact bilaterally,  visual fields full to confrontation, pupils equal round and reactive to light [Cranial Nerves Oculomotor (III)] : extraocular motion intact [Cranial Nerves Trigeminal (V)] : facial sensation intact symmetrically [Cranial Nerves Facial (VII)] : face symmetrical [Cranial Nerves Vestibulocochlear (VIII)] : hearing was intact bilaterally [Cranial Nerves Glossopharyngeal (IX)] : tongue and palate midline [Cranial Nerves Accessory (XI - Cranial And Spinal)] : head turning and shoulder shrug symmetric [Cranial Nerves Hypoglossal (XII)] : there was no tongue deviation with protrusion [Motor Tone] : muscle tone was normal in all four extremities [Motor Strength] : muscle strength was normal in all four extremities [Sensation Tactile Decrease] : light touch was intact [Sensation Pain / Temperature Decrease] : pain and temperature was intact [Sensation Vibration Decrease] : vibration was intact [Abnormal Walk] : normal gait [1+] : Patella left 1+ [Optic Disc Abnormality] : the optic disc were normal in size and color [Edema] : there was no peripheral edema [Involuntary Movements] : no involuntary movements were seen [Dysarthria] : no dysarthria [Aphasia] : no dysphasia/aphasia [Romberg's Sign] : Romberg's sign was negtive [Coordination - Dysmetria Impaired Finger-to-Nose Bilateral] : not present [Plantar Reflex Right Only] : normal on the right [Plantar Reflex Left Only] : normal on the left [FreeTextEntry1] : Tinel sign is present at the wrists bilaterally.

## 2021-08-19 NOTE — HISTORY OF PRESENT ILLNESS
[FreeTextEntry1] : T2DM & HTN [de-identified] : -PMH: T2DM, HTN, HLD, GERD, Asthma, H/o TIA \par -SH: . Employed. Current smoker. Occasional EtOH use. \par \par ROXI is a 55 year F whom is here today for f/u HTN and T2DM\par Today, pt reports feeling well and is w/o complaints\par She denies any changes since our last followup visit\par \par -T2DM: Remains on metformin 750mg BID. No longer on Alogliptin 25mg QD. On Statin & Aspirin. Not checking her glucose levels (Avg 90's). (6/2021) A1c 6.1. (12/2020) Optho Eval: No Retinopathy. No reported changes.\par \par -HTN: Remains on Labetalol 100mg BID & Amlodipine 5mg QD. Follows w/ Cardio. No reported changes\par -HLD & HyperTG: Remains on Atorvastatin 40mg HS and Ezetimibe 10mg. No reported changes.\par \par -GERD: Remains on Pantoprazole 40mg. Last EGD ~2yrs ago. No reported changes\par -Asthma: Remains on Montelukast 10mh QD. No reported changes\par \par -Current Smoker: Smoking 1PPD. On Bupropion 300mg ER QD as per Cardio.

## 2021-08-20 LAB
ESTIMATED AVERAGE GLUCOSE: 128 MG/DL
HBA1C MFR BLD HPLC: 6.1 %

## 2021-08-23 ENCOUNTER — NON-APPOINTMENT (OUTPATIENT)
Age: 56
End: 2021-08-23

## 2021-08-24 ENCOUNTER — NON-APPOINTMENT (OUTPATIENT)
Age: 56
End: 2021-08-24

## 2021-12-08 ENCOUNTER — APPOINTMENT (OUTPATIENT)
Dept: INTERNAL MEDICINE | Facility: CLINIC | Age: 56
End: 2021-12-08
Payer: COMMERCIAL

## 2021-12-08 VITALS
WEIGHT: 213 LBS | HEART RATE: 101 BPM | BODY MASS INDEX: 34.23 KG/M2 | SYSTOLIC BLOOD PRESSURE: 144 MMHG | DIASTOLIC BLOOD PRESSURE: 76 MMHG | RESPIRATION RATE: 16 BRPM | HEIGHT: 66 IN | OXYGEN SATURATION: 97 % | TEMPERATURE: 97.3 F

## 2021-12-08 DIAGNOSIS — Z63.4 DISAPPEARANCE AND DEATH OF FAMILY MEMBER: ICD-10-CM

## 2021-12-08 DIAGNOSIS — K21.9 GASTRO-ESOPHAGEAL REFLUX DISEASE W/OUT ESOPHAGITIS: ICD-10-CM

## 2021-12-08 PROCEDURE — 90715 TDAP VACCINE 7 YRS/> IM: CPT

## 2021-12-08 PROCEDURE — 90471 IMMUNIZATION ADMIN: CPT

## 2021-12-08 PROCEDURE — 99396 PREV VISIT EST AGE 40-64: CPT | Mod: 25

## 2021-12-08 PROCEDURE — 90472 IMMUNIZATION ADMIN EACH ADD: CPT

## 2021-12-08 PROCEDURE — 90686 IIV4 VACC NO PRSV 0.5 ML IM: CPT

## 2021-12-08 SDOH — SOCIAL STABILITY - SOCIAL INSECURITY: DISSAPEARANCE AND DEATH OF FAMILY MEMBER: Z63.4

## 2021-12-08 NOTE — ASSESSMENT
[FreeTextEntry1] : -PMH: T2DM w/ Neuropathy, HTN, HLD, GERD, Asthma, H/o TIA (Small Lacunar Infarct), Depression\par -SH: . 1 children. Employed. Current smoker. Occasional EtOH use. \par \par ROXI is a 56 year F whom is here today for an annual well check\par \par Specialists Involved:\par -Cardio: Dr. Villafana ([phone])\par -OBGYN: Dr. Wu ([phone])\par -Podiatry: [phone])\par -GI: Dr. Woods ([phone])\par -Neuro: Dr. Ferraro\par \par -F/u labs drawn in office today\par -Further recs pending lab results\par -F/u Lung CA CT screening\par -F/u records from GI & Gyn\par -Continue f/u w/ Cardio (HTN, HLD)\par -Continue f/u w/ Neuro (Neuropathy & H/o TIA)\par -RTO 6mo for routine f/u & FULL labs or sooner if needed.

## 2021-12-08 NOTE — COUNSELING
[Potential consequences of obesity discussed] : Potential consequences of obesity discussed [Benefits of weight loss discussed] : Benefits of weight loss discussed [Structured Weight Management Program suggested:] : Structured weight management program suggested [Encouraged to maintain food diary] : Encouraged to maintain food diary [Encouraged to increase physical activity] : Encouraged to increase physical activity [Encouraged to use exercise tracking device] : Encouraged to use exercise tracking device [Needs reinforcement, referred] : Patient needs reinforcement on understanding of disease, goals and obesity follow-up plan; patient was referred

## 2021-12-08 NOTE — HEALTH RISK ASSESSMENT
[Very Good] : ~his/her~  mood as very good [Reviewed no changes] : Reviewed, no changes [] : Yes [20 or more] : 20 or more [Yes] : Yes [Monthly or less (1 pt)] : Monthly or less (1 point) [1 or 2 (0 pts)] : 1 or 2 (0 points) [Never (0 pts)] : Never (0 points) [No] : In the past 12 months have you used drugs other than those required for medical reasons? No [Other reason not done] : Other reason not done [I have developed a follow-up plan documented below in the note.] : I have developed a follow-up plan documented below in the note. [Patient reported mammogram was normal] : Patient reported mammogram was normal [HIV test declined] : HIV test declined [Hepatitis C test declined] : Hepatitis C test declined [Alone] : lives alone [Employed] : employed [Single] : single [] :  [# Of Children ___] : has [unfilled] children [Audit-CScore] : 1 [de-identified] : Being managed on Buprion  [Change in mental status noted] : No change in mental status noted [Sexually Active] : not sexually active [Reports changes in hearing] : Reports no changes in hearing [Reports changes in vision] : Reports no changes in vision [MammogramDate] : 07/21 [MammogramComments] : records requested from Gyn [PapSmearComments] : records requested from Gyn [ColonoscopyComments] :  records requested [AdvancecareDate] : 12/21

## 2021-12-08 NOTE — HISTORY OF PRESENT ILLNESS
[FreeTextEntry1] : Annual well check  [de-identified] : -PMH: T2DM w/ Neuropathy, HTN, HLD, GERD, Asthma, H/o TIA (Small Lacunar Infarct), Depression\par -SH: . 1 children. Employed. Current smoker. Occasional EtOH use. \par \par ROXI is a 56 year F whom is here today for an annual well check\par Today, pt reports feeling well and is w/o complaints. \par She denies any changes since our last f/u visit\par \par Specialists Involved:\par -Cardio: Dr. Villafana ([phone])\par -OBGYN: Dr. Wu ([phone])\par -Podiatry: [phone])\par -GI: Dr. Woods ([phone])\par -Neuro: Dr. Ferraro\par \par -Vaccines: Needs PPSV 23, Shingles\par -Mammogram: 7/2021\par -Pap Smear: Records requested\par -Colonoscopy: records requested\par -Lung CT CA Screening: Scrip given today\par -FH of Colon, breast or ovarian CA: Pt adopted. \par \par -T2DM: Now on lowered Mmetformin ER dose of 1000mg QD. On ASA & Statin. Compliant w/ BG monitoring. (8/2021) A1c 6.1. (12/2020) Optho Eval: No Retinopathy. No reported changes.\par -Diabetic Neuropathy: Now resolved. \par \par -HTN: Remains on Labetalol 100mg BID & Amlodipine 5mg QD. Follows w/ Cardio. No reported changes\par -HLD & HyperTG: Remains on Atorvastatin 40mg HS & Ezetimibe 10mg QD. No reported changes.\par \par -GERD: Remains on Pantoprazole 40mg. Last EGD ~2yrs ago. No reported changes\par -Asthma: Remains on Montelukast 10mh QD & PRN Albuterol Inh. No reported changes\par -Depression, Current Smoker: Smoking 1PPD. On Bupropion 300mg ER QD as per Cardio.

## 2021-12-09 ENCOUNTER — NON-APPOINTMENT (OUTPATIENT)
Age: 56
End: 2021-12-09

## 2021-12-09 LAB
ALBUMIN SERPL ELPH-MCNC: 4.5 G/DL
ALP BLD-CCNC: 109 U/L
ALT SERPL-CCNC: 45 U/L
ANION GAP SERPL CALC-SCNC: 12 MMOL/L
AST SERPL-CCNC: 37 U/L
BASOPHILS # BLD AUTO: 0.03 K/UL
BASOPHILS NFR BLD AUTO: 0.4 %
BILIRUB SERPL-MCNC: 0.2 MG/DL
BUN SERPL-MCNC: 15 MG/DL
CALCIUM SERPL-MCNC: 9.8 MG/DL
CHLORIDE SERPL-SCNC: 105 MMOL/L
CHOLEST SERPL-MCNC: 152 MG/DL
CO2 SERPL-SCNC: 24 MMOL/L
CREAT SERPL-MCNC: 0.99 MG/DL
CREAT SPEC-SCNC: 242 MG/DL
EOSINOPHIL # BLD AUTO: 0.32 K/UL
EOSINOPHIL NFR BLD AUTO: 4.2 %
ESTIMATED AVERAGE GLUCOSE: 131 MG/DL
FOLATE SERPL-MCNC: >20 NG/ML
GLUCOSE SERPL-MCNC: 136 MG/DL
HBA1C MFR BLD HPLC: 6.2 %
HCT VFR BLD CALC: 37 %
HDLC SERPL-MCNC: 32 MG/DL
HGB BLD-MCNC: 11.6 G/DL
IMM GRANULOCYTES NFR BLD AUTO: 0.3 %
LDLC SERPL CALC-MCNC: 77 MG/DL
LYMPHOCYTES # BLD AUTO: 3.1 K/UL
LYMPHOCYTES NFR BLD AUTO: 41.1 %
MAN DIFF?: NORMAL
MCHC RBC-ENTMCNC: 27.9 PG
MCHC RBC-ENTMCNC: 31.4 GM/DL
MCV RBC AUTO: 88.9 FL
MICROALBUMIN 24H UR DL<=1MG/L-MCNC: 4.1 MG/DL
MICROALBUMIN/CREAT 24H UR-RTO: 17 MG/G
MONOCYTES # BLD AUTO: 0.59 K/UL
MONOCYTES NFR BLD AUTO: 7.8 %
NEUTROPHILS # BLD AUTO: 3.48 K/UL
NEUTROPHILS NFR BLD AUTO: 46.2 %
NONHDLC SERPL-MCNC: 120 MG/DL
PLATELET # BLD AUTO: 341 K/UL
POTASSIUM SERPL-SCNC: 4.4 MMOL/L
PROT SERPL-MCNC: 7.3 G/DL
RBC # BLD: 4.16 M/UL
RBC # FLD: 14.8 %
SODIUM SERPL-SCNC: 141 MMOL/L
TRIGL SERPL-MCNC: 212 MG/DL
VIT B12 SERPL-MCNC: >2000 PG/ML
WBC # FLD AUTO: 7.54 K/UL

## 2022-03-03 ENCOUNTER — NON-APPOINTMENT (OUTPATIENT)
Age: 57
End: 2022-03-03

## 2022-03-30 ENCOUNTER — APPOINTMENT (OUTPATIENT)
Dept: INTERNAL MEDICINE | Facility: CLINIC | Age: 57
End: 2022-03-30
Payer: COMMERCIAL

## 2022-03-30 ENCOUNTER — NON-APPOINTMENT (OUTPATIENT)
Age: 57
End: 2022-03-30

## 2022-03-30 VITALS
DIASTOLIC BLOOD PRESSURE: 90 MMHG | BODY MASS INDEX: 34.39 KG/M2 | TEMPERATURE: 97.2 F | WEIGHT: 214 LBS | OXYGEN SATURATION: 97 % | SYSTOLIC BLOOD PRESSURE: 135 MMHG | RESPIRATION RATE: 16 BRPM | HEART RATE: 88 BPM | HEIGHT: 66 IN

## 2022-03-30 PROCEDURE — 99396 PREV VISIT EST AGE 40-64: CPT | Mod: 25

## 2022-03-30 PROCEDURE — 99406 BEHAV CHNG SMOKING 3-10 MIN: CPT

## 2022-03-30 PROCEDURE — 99213 OFFICE O/P EST LOW 20 MIN: CPT | Mod: 25

## 2022-03-30 RX ORDER — BLOOD SUGAR DIAGNOSTIC
STRIP MISCELLANEOUS DAILY
Qty: 90 | Refills: 3 | Status: ACTIVE | COMMUNITY
Start: 2021-06-09 | End: 1900-01-01

## 2022-03-30 NOTE — HISTORY OF PRESENT ILLNESS
[FreeTextEntry1] : Annual well check  [de-identified] : -PMH: T2DM w/ Neuropathy, HTN, HLD, GERD, Asthma, H/o TIA (Small Lacunar Infarct), Depression\par -SH: . 1 children. Employed. Current smoker. Occasional EtOH use. \par \par ROXI is a 56 year F whom is here today for an annual well check\par Today, pt reports feeling well and is w/o complaints. \par She denies any changes since our last f/u visit\par \par Specialists Involved:\par -Cardio: Dr. Villafana ([phone])\par -OBGYN: Dr. Wu ([phone])\par -Podiatry: [phone])\par -GI: Dr. Woods ([phone])\par -Neuro: Dr. Joshua Ferraro\par \par -Vaccines: Needs PPSV 23, Shingles\par -Mammogram: 7/2021\par -Pap Smear: 3/2021\par -Colonoscopy: 8/2019. Repeat 5yr\par -Lung CT CA Screening: Scrip given again today\par -FH of Colon, breast or ovarian CA: Pt adopted. \par \par -T2DM: Remains on Metformin ER 1000mg QD. On ASA & Statin. Compliant w/ BG monitoring. (12/2021) A1c 6.2. (12/2021) Optho Eval: No Retinopathy. No reported changes.\par -Diabetic Neuropathy: Now resolved. \par \par -HTN: Remains on Labetalol 100mg BID & Amlodipine 5mg QD. To start Losartan. Follows w/ Cardio. No reported changes\par -HLD & HyperTG: Remains on Atorvastatin 40mg HS & Ezetimibe 10mg QD. No reported changes.\par \par -GERD: Remains on Pantoprazole 40mg. Last EGD ~2yrs ago. No reported changes\par -Asthma: Remains on Montelukast 10mh QD & PRN Albuterol Inh. No reported changes\par -Depression, Current Smoker: Smoking 1PPD. On Bupropion 300mg ER QD as per Cardio.

## 2022-03-30 NOTE — ASSESSMENT
[FreeTextEntry1] : -PMH: T2DM w/ Neuropathy, HTN, HLD, GERD, Asthma, H/o TIA (Small Lacunar Infarct), Depression\par -SH: . 1 children. Employed. Current smoker. Occasional EtOH use. \par \par ROXI is a 56 year F whom is here today for an annual well check\par \par Specialists Involved:\par -Cardio: Dr. Villafana ([phone])\par -OBGYN: Dr. Wu ([phone])\par -Podiatry: [phone])\par -GI: Dr. Woods ([phone])\par -Neuro: Dr. Ferraro\par \par -F/u labs drawn in office today\par -Further recs pending lab results\par -Still needs to obtain Lung CA CT screening\par -Continue f/u w/ Cardio (HTN, HLD)\par -Continue f/u w/ Neuro (Neuropathy & H/o TIA)\par -Continue annual f/u w/ Gyn\par -RTO 3mo for routine f/u & FULL labs or sooner if needed

## 2022-03-30 NOTE — COUNSELING
[Yes] : Risk of tobacco use and health benefits of smoking cessation discussed: Yes [Cessation strategies including cessation program discussed] : Cessation strategies including cessation program discussed [Use of nicotine replacement therapies and other medications discussed] : Use of nicotine replacement therapies and other medications discussed [Encouraged to pick a quit date and identify support needed to quit] : Encouraged to pick a quit date and identify support needed to quit [Smoking Cessation Program Referral] : Smoking Cessation Program Referral  [No] : Not willing to quit smoking [Potential consequences of obesity discussed] : Potential consequences of obesity discussed [Benefits of weight loss discussed] : Benefits of weight loss discussed [Structured Weight Management Program suggested:] : Structured weight management program suggested [Encouraged to maintain food diary] : Encouraged to maintain food diary [Encouraged to increase physical activity] : Encouraged to increase physical activity [Encouraged to use exercise tracking device] : Encouraged to use exercise tracking device [Needs reinforcement, referred] : Patient needs reinforcement on understanding of disease, goals and obesity follow-up plan; patient was referred

## 2022-05-10 ENCOUNTER — APPOINTMENT (OUTPATIENT)
Dept: NEUROLOGY | Facility: CLINIC | Age: 57
End: 2022-05-10
Payer: COMMERCIAL

## 2022-05-10 VITALS
SYSTOLIC BLOOD PRESSURE: 134 MMHG | WEIGHT: 214 LBS | DIASTOLIC BLOOD PRESSURE: 70 MMHG | BODY MASS INDEX: 34.39 KG/M2 | HEIGHT: 66 IN

## 2022-05-10 PROCEDURE — 99213 OFFICE O/P EST LOW 20 MIN: CPT

## 2022-05-10 NOTE — HISTORY OF PRESENT ILLNESS
[FreeTextEntry1] : I saw this patient in the office today.\par \par As you recall, she described a long history of lower back pain and sciatica on the right side. \par This has been going on for many years. It had become worse and began to radiate down the right leg into the foot.\par It is quite severe.\par It is associated with some numbness and tingling in the right leg.\par She reports that occasionally she will notice improvement if she changes position.\par She reports that she went for pain management injection and has had good relief.\par \par She also has had symptoms of carpal tunnel syndrome in both hands for the past several years. This had been somewhat worse and she has seen an orthopedic specialist for it.\par \par She was found to have neuropathy on nerve conduction study.\par \par In early January of 2019 she passed out briefly.\par She has had no further episodes.\par She was sent for an MRI of the brain.\par She was noted to have a small lacunar infarct that appeared chronic.\par \par

## 2022-05-10 NOTE — ASSESSMENT
[FreeTextEntry1] : This is a 56 year-old woman with a history of lower back pains. She has seen a spinal specialist for this.\par \par She also has symptoms of carpal tunnel syndrome for which she has been wearing wrist braces at night.\par She has seen an orthopedist for this and reports that she was told that surgery was an option if the pain becomes unbearable.\par \par She also has some mild findings of neuropathy on nerve conduction studies.\par I had advised her to maintain good blood sugar control. \par \par She had an episode of syncope and a small lacunar infarct was seen on brain MRI.\par Likely this is secondary to small vessel disease.\par I would recommend she continue antiplatelet statin medication.\par \par I have given her some simple exercises she could do at home for the dizziness.\par \par I will see her back in 6 months.\par \par

## 2022-05-13 ENCOUNTER — APPOINTMENT (OUTPATIENT)
Dept: ORTHOPEDIC SURGERY | Facility: CLINIC | Age: 57
End: 2022-05-13
Payer: MEDICAID

## 2022-05-13 VITALS
TEMPERATURE: 98.1 F | BODY MASS INDEX: 34.55 KG/M2 | HEART RATE: 85 BPM | SYSTOLIC BLOOD PRESSURE: 149 MMHG | WEIGHT: 215 LBS | HEIGHT: 66 IN | DIASTOLIC BLOOD PRESSURE: 85 MMHG

## 2022-05-13 DIAGNOSIS — R20.2 PARESTHESIA OF SKIN: ICD-10-CM

## 2022-05-13 PROCEDURE — 99214 OFFICE O/P EST MOD 30 MIN: CPT

## 2022-05-18 ENCOUNTER — APPOINTMENT (OUTPATIENT)
Dept: ORTHOPEDIC SURGERY | Facility: CLINIC | Age: 57
End: 2022-05-18
Payer: COMMERCIAL

## 2022-05-18 VITALS
BODY MASS INDEX: 34.55 KG/M2 | HEIGHT: 66 IN | DIASTOLIC BLOOD PRESSURE: 71 MMHG | HEART RATE: 91 BPM | SYSTOLIC BLOOD PRESSURE: 146 MMHG | WEIGHT: 215 LBS

## 2022-05-18 DIAGNOSIS — M75.100 UNSPECIFIED ROTATOR CUFF TEAR OR RUPTURE OF UNSPECIFIED SHOULDER, NOT SPECIFIED AS TRAUMATIC: ICD-10-CM

## 2022-05-18 PROCEDURE — 73030 X-RAY EXAM OF SHOULDER: CPT | Mod: LT

## 2022-05-18 PROCEDURE — 99214 OFFICE O/P EST MOD 30 MIN: CPT

## 2022-05-18 NOTE — DISCUSSION/SUMMARY
[Medication Risks Reviewed] : Medication risks reviewed [Surgical risks reviewed] : Surgical risks reviewed [de-identified] : Patient is a 56-year-old female with left shoulder pain and rotator cuff tear here today for initial evaluation.  I had a thorough discussion with her about smoking cessation I think that would be required prior to any surgical intervention.  I gave her referral to the smoking cessation hotline.  I also recommended a repeat MRI of her left shoulder for further evaluation of her part rotator cuff tear as she has not had an MRI in recent time.  I have also recommended physical therapy and given a prescription for that.  I given a prescription for diclofenac 50 mg twice daily.  I referred her back to my colleague for possible rotator cuff repair if she is able to quit smoking.  All questions were asked and answered.  I will see her back on an as-needed basis.

## 2022-05-18 NOTE — HISTORY OF PRESENT ILLNESS
[Pain Location] : pain [] : left shoulder [Worsening] : worsening [Intermit.] : ~He/She~ states the symptoms seem to be intermittent [Direct Pressure] : worsened by direct pressure [Lifting] : worsened by lifting [Rest] : relieved by rest [de-identified] : 05/18/2022  \par Ms. LORENZANA is a pleasant 56 year old female HD[R] who presents with Left shoulder pain. \par \par She is diabetic, and her most recent hemoglobin A1c level reportedly was 6.2% in December of 2021\par \par Patient is a 56-year-old female present with chief complaint of left shoulder pain has been going for many years.  She is previously seen by one of my partners who diagnosed her with a rotator cuff tear.  I recommended a repeat MRI however she did not obtain this because she was told that she would not have surgery if she was smoking and was unable to quit smoking at that time.  States that she still has pain in the posterior and superior aspects of her shoulder.  Hurts with overhead activities as well as reaching behind her back.  Causes her significant discomfort.  Has been in physical therapy in the past and relief of the pain in her shoulder.  Is not currently taking any pain medications.  Denies any history of trauma.\par

## 2022-05-18 NOTE — REVIEW OF SYSTEMS
[Joint Pain] : joint pain [Joint Stiffness] : joint stiffness [Negative] : Heme/Lymph [FreeTextEntry9] : Left shoulder

## 2022-05-18 NOTE — CONSULT LETTER
[Dear  ___] : Dear  [unfilled], [Consult Letter:] : I had the pleasure of evaluating your patient, [unfilled]. [( Thank you for referring [unfilled] for consultation for _____ )] : Thank you for referring [unfilled] for consultation for [unfilled] [Please see my note below.] : Please see my note below. [Consult Closing:] : Thank you very much for allowing me to participate in the care of this patient.  If you have any questions, please do not hesitate to contact me. [Sincerely,] : Sincerely, [FreeTextEntry2] : JULES RAE\par  [FreeTextEntry3] : Dion Jacobs MD\par Orthopaedic Surgery\par Primary/Revision Hip & Knee Orthoplasty\par [facility] [place]\par \par [facility] \par [address] \par [address] \[place] [address]\par \par Tel [phone]\par Fax [phone]\par \par For same day and next day orthopedic appointments contact:\par [email or web address] |[phone] Appointments available nights and weekends!  \par \par [facility] [place]\par Visit us at [facility]/orthopaedic\par

## 2022-05-18 NOTE — PHYSICAL EXAM
Patient Information     Patient Name MRN Regan Vincent [id number] Male 1952      Telephone Encounter by Rigo Marshall MD at 2017  1:02 PM     Author:  Rigo Marshall MD Service:  (none) Author Type:  Physician     Filed:  2017  1:02 PM Encounter Date:  2017 Status:  Signed     :  Rigo Marshall MD (Physician)            Prescription for Ativan faxed to his pharmacy.         [de-identified] : GENERAL APPEARANCE: Well nourished and hydrated, pleasant, alert, and oriented x 3. Appears their stated age. \par HEENT: Normocephalic, extraocular eye motion intact. Nasal septum midline. Oral cavity clear. External auditory canal clear. \par RESPIRATORY: Breath sounds clear and audible in all lobes. No wheezing, No accessory muscle use.\par CARDIOVASCULAR: No apparent abnormalities. No lower leg edema. No varicosities. Pedal pulses are palpable.\par NEUROLOGIC: Sensation is normal, no muscle weakness in the upper or lower extremities.\par DERMATOLOGIC: No apparent skin lesions, moist, warm, no rash.\par SPINE: Cervical spine appears normal and moves freely; thoracic spine appears normal and moves freely; lumbosacral spine appears normal and moves freely, normal, nontender.\par MUSCULOSKELETAL: Hands, wrists, and elbows are normal and move freely, \par Psychiatric: Oriented to person, place, and time, insight and judgement were intact and the affect was normal. \par Left upper extremity: Full active and passive range of motion of left shoulder with pain extreme, positive empty can on the left, mildly positive cross chest, negative Apache's, negative belly press, AIN PIN median radial ulnar nerves intact, fingers warm well-perfused brisk cap refill [de-identified] : 4 views left shoulder obtained the office today show no acute fracture or dislocation.  No significant degenerative changes noted.

## 2022-06-24 ENCOUNTER — APPOINTMENT (OUTPATIENT)
Dept: INTERNAL MEDICINE | Facility: CLINIC | Age: 57
End: 2022-06-24
Payer: COMMERCIAL

## 2022-06-24 VITALS
BODY MASS INDEX: 33.19 KG/M2 | HEIGHT: 68 IN | TEMPERATURE: 98.3 F | SYSTOLIC BLOOD PRESSURE: 130 MMHG | OXYGEN SATURATION: 98 % | WEIGHT: 219 LBS | HEART RATE: 76 BPM | DIASTOLIC BLOOD PRESSURE: 60 MMHG

## 2022-06-24 PROCEDURE — 99214 OFFICE O/P EST MOD 30 MIN: CPT | Mod: 25

## 2022-06-24 RX ORDER — AZITHROMYCIN 250 MG/1
250 TABLET, FILM COATED ORAL
Qty: 6 | Refills: 0 | Status: DISCONTINUED | COMMUNITY
Start: 2022-05-24

## 2022-06-24 RX ORDER — LOSARTAN POTASSIUM 50 MG/1
50 TABLET, FILM COATED ORAL
Qty: 90 | Refills: 0 | Status: DISCONTINUED | COMMUNITY
Start: 2022-02-23

## 2022-06-24 RX ORDER — CLINDAMYCIN PHOSPHATE 10 MG/ML
1 SOLUTION TOPICAL
Qty: 60 | Refills: 0 | Status: DISCONTINUED | COMMUNITY
Start: 2022-05-16

## 2022-06-24 RX ORDER — CLINDAMYCIN HYDROCHLORIDE 300 MG/1
300 CAPSULE ORAL
Qty: 14 | Refills: 0 | Status: DISCONTINUED | COMMUNITY
Start: 2022-02-23

## 2022-06-24 RX ORDER — BLOOD SUGAR DIAGNOSTIC
STRIP MISCELLANEOUS
Qty: 4 | Refills: 0 | Status: DISCONTINUED | COMMUNITY
Start: 2022-03-08

## 2022-06-24 RX ORDER — DOXYCYCLINE HYCLATE 100 MG/1
100 CAPSULE ORAL
Qty: 30 | Refills: 0 | Status: DISCONTINUED | COMMUNITY
Start: 2022-05-16

## 2022-06-24 RX ORDER — IBUPROFEN 800 MG/1
800 TABLET, FILM COATED ORAL
Qty: 21 | Refills: 0 | Status: DISCONTINUED | COMMUNITY
Start: 2022-03-02

## 2022-06-24 RX ORDER — OXYCODONE AND ACETAMINOPHEN 5; 325 MG/1; MG/1
5-325 TABLET ORAL
Qty: 18 | Refills: 0 | Status: DISCONTINUED | COMMUNITY
Start: 2022-03-08

## 2022-06-24 NOTE — HISTORY OF PRESENT ILLNESS
[FreeTextEntry1] :  HTN, HLD, T2DM, Depression [de-identified] : -PMH: T2DM w/ Neuropathy, HTN, HLD, GERD, Asthma, H/o TIA (Small Lacunar Infarct), Depression\par -SH: . 1 children. Employed. Current smoker. Occasional EtOH use. \par \par ROXI is a 56 year F whom is here today for f/u HTN, HLD, T2DM, Depression\par Today, pt reports feeling well \par \par -T2DM: Remains on Metformin ER 1000mg QD. On ASA & Statin. Compliant w/ BG monitoring. (12/2021) A1c 6.2. (12/2021) Optho Eval: No Retinopathy. No reported changes.\par -Diabetic Neuropathy: Now resolved. \par \par -HTN: Remains on Losartan 25mg QD, Labetalol 100mg BID & Amlodipine 5mg QD. Follows w/ Cardio. No reported changes\par -HLD & HyperTG: Remains on Atorvastatin 40mg HS & Ezetimibe 10mg QD. No reported changes.\par \par -GERD: Remains on Pantoprazole 40mg. Last EGD ~2yrs ago. No reported changes\par -Asthma: Remains on Montelukast 10mh QD & PRN Albuterol Inh. No reported changes\par \par -Depression, Current Smoker: Smoking 1PPD. On Bupropion 300mg ER QD as per Cardio.

## 2022-06-24 NOTE — ASSESSMENT
[FreeTextEntry1] : -PMH: T2DM w/ Neuropathy, HTN, HLD, GERD, Asthma, H/o TIA (Small Lacunar Infarct), Depression\par -SH: . 1 children. Employed. Current smoker. Occasional EtOH use. \par \par ROXI is a 56 year F whom is here today for f/u HTN, HLD, T2DM, Depression\par \par Specialists Involved:\par -Cardio: Dr. Villafana ([phone])\par -OBGYN: Dr. Wu ([phone])\par -Podiatry: [phone])\par -GI: Dr. Woods ([phone])\par -Neuro: Dr. Ferraro\par \par -F/u labs drawn in office today\par -Further recs pending lab results\par -Continue f/u w/ Cardio (HTN, HLD)\par -Continue f/u w/ Neuro (Neuropathy & H/o TIA)\par -Continue annual f/u w/ Gyn\par -RTO 3mo for routine f/u or sooner if needed

## 2022-06-26 LAB
ALBUMIN SERPL ELPH-MCNC: 4.4 G/DL
ALP BLD-CCNC: 116 U/L
ALT SERPL-CCNC: 55 U/L
ANION GAP SERPL CALC-SCNC: 12 MMOL/L
AST SERPL-CCNC: 45 U/L
BILIRUB SERPL-MCNC: 0.4 MG/DL
BUN SERPL-MCNC: 13 MG/DL
CALCIUM SERPL-MCNC: 9.8 MG/DL
CHLORIDE SERPL-SCNC: 106 MMOL/L
CHOLEST SERPL-MCNC: 167 MG/DL
CO2 SERPL-SCNC: 24 MMOL/L
CREAT SERPL-MCNC: 0.88 MG/DL
EGFR: 78 ML/MIN/1.73M2
ESTIMATED AVERAGE GLUCOSE: 137 MG/DL
GLUCOSE SERPL-MCNC: 129 MG/DL
HBA1C MFR BLD HPLC: 6.4 %
HDLC SERPL-MCNC: 31 MG/DL
LDLC SERPL CALC-MCNC: 81 MG/DL
NONHDLC SERPL-MCNC: 136 MG/DL
POTASSIUM SERPL-SCNC: 4.4 MMOL/L
PROT SERPL-MCNC: 7.2 G/DL
SODIUM SERPL-SCNC: 142 MMOL/L
TRIGL SERPL-MCNC: 274 MG/DL

## 2022-06-28 ENCOUNTER — NON-APPOINTMENT (OUTPATIENT)
Age: 57
End: 2022-06-28

## 2022-07-12 ENCOUNTER — APPOINTMENT (OUTPATIENT)
Dept: INTERNAL MEDICINE | Facility: CLINIC | Age: 57
End: 2022-07-12

## 2022-07-12 PROCEDURE — 36415 COLL VENOUS BLD VENIPUNCTURE: CPT

## 2022-07-13 LAB
ALBUMIN SERPL ELPH-MCNC: 4.2 G/DL
ALP BLD-CCNC: 126 U/L
ALT SERPL-CCNC: 59 U/L
ANION GAP SERPL CALC-SCNC: 13 MMOL/L
AST SERPL-CCNC: 59 U/L
BILIRUB SERPL-MCNC: 0.2 MG/DL
BUN SERPL-MCNC: 8 MG/DL
CALCIUM SERPL-MCNC: 10 MG/DL
CHLORIDE SERPL-SCNC: 104 MMOL/L
CO2 SERPL-SCNC: 25 MMOL/L
CREAT SERPL-MCNC: 0.9 MG/DL
EGFR: 76 ML/MIN/1.73M2
GLUCOSE SERPL-MCNC: 126 MG/DL
POTASSIUM SERPL-SCNC: 5 MMOL/L
PROT SERPL-MCNC: 7.4 G/DL
SODIUM SERPL-SCNC: 142 MMOL/L

## 2022-07-14 ENCOUNTER — NON-APPOINTMENT (OUTPATIENT)
Age: 57
End: 2022-07-14

## 2022-08-02 ENCOUNTER — NON-APPOINTMENT (OUTPATIENT)
Age: 57
End: 2022-08-02

## 2022-08-02 NOTE — HISTORY OF PRESENT ILLNESS
Body Location Override (Optional): right breast
Detail Level: Detailed
Add 48314 Cpt? (Important Note: In 2017 The Use Of 91272 Is Being Tracked By Cms To Determine Future Global Period Reimbursement For Global Periods): yes
[FreeTextEntry1] : I saw this patient in the office today.\par \par As you recall, she described a long history of lower back pain and sciatica on the right side. \par This has been going on for many years. It had become worse and began to radiate down the right leg into the foot.\par It is quite severe.\par It is associated with some numbness and tingling in the right leg.\par She reports that occasionally she will notice improvement if she changes position.\par She reports that she went for pain management injection and has had good relief.\par \par She also has had symptoms of carpal tunnel syndrome in both hands for the past several years. This had been somewhat worse and she has seen an orthopedic specialist for it.\par \par She was found to have neuropathy on nerve conduction study.\par \par She now reports that in early January of 2019 she passed out briefly.\par She has had no further episodes.\par She was sent for an MRI of the brain.\par She was noted to have a small lacunar infarct that appeared chronic.\par \par She describes occasional dizzy spells when she turns her head.\par 
Wound Evaluated By (Optional): Obdulia Dorantes PA-C, Gemini Lackey
Wound Diameter In Cm(Optional): 0

## 2022-08-05 RX ORDER — LANCETS 28 GAUGE
EACH MISCELLANEOUS
Qty: 1 | Refills: 3 | Status: ACTIVE | COMMUNITY
Start: 2021-06-09 | End: 1900-01-01

## 2022-08-29 ENCOUNTER — APPOINTMENT (OUTPATIENT)
Dept: INTERNAL MEDICINE | Facility: CLINIC | Age: 57
End: 2022-08-29

## 2022-08-29 VITALS
DIASTOLIC BLOOD PRESSURE: 70 MMHG | TEMPERATURE: 97.1 F | SYSTOLIC BLOOD PRESSURE: 120 MMHG | HEIGHT: 68 IN | WEIGHT: 222 LBS | HEART RATE: 80 BPM | OXYGEN SATURATION: 98 % | BODY MASS INDEX: 33.65 KG/M2

## 2022-08-29 DIAGNOSIS — Z23 ENCOUNTER FOR IMMUNIZATION: ICD-10-CM

## 2022-08-29 LAB
BASOPHILS # BLD AUTO: 0.04 K/UL
BASOPHILS NFR BLD AUTO: 0.6 %
EOSINOPHIL # BLD AUTO: 0.19 K/UL
EOSINOPHIL NFR BLD AUTO: 3 %
FERRITIN SERPL-MCNC: 46 NG/ML
HCT VFR BLD CALC: 38 %
HGB BLD-MCNC: 12 G/DL
IMM GRANULOCYTES NFR BLD AUTO: 0 %
LYMPHOCYTES # BLD AUTO: 2.28 K/UL
LYMPHOCYTES NFR BLD AUTO: 36.5 %
MAN DIFF?: NORMAL
MCHC RBC-ENTMCNC: 27.8 PG
MCHC RBC-ENTMCNC: 31.6 GM/DL
MCV RBC AUTO: 88.2 FL
MONOCYTES # BLD AUTO: 0.51 K/UL
MONOCYTES NFR BLD AUTO: 8.2 %
NEUTROPHILS # BLD AUTO: 3.23 K/UL
NEUTROPHILS NFR BLD AUTO: 51.7 %
PLATELET # BLD AUTO: 327 K/UL
RBC # BLD: 4.31 M/UL
RBC # FLD: 14.2 %
TSH SERPL-ACNC: 1.88 UIU/ML
WBC # FLD AUTO: 6.25 K/UL

## 2022-08-29 PROCEDURE — 36415 COLL VENOUS BLD VENIPUNCTURE: CPT

## 2022-08-29 PROCEDURE — 90677 PCV20 VACCINE IM: CPT

## 2022-08-29 PROCEDURE — G0009: CPT

## 2022-08-29 PROCEDURE — 99214 OFFICE O/P EST MOD 30 MIN: CPT | Mod: 25

## 2022-08-30 LAB
ALBUMIN SERPL ELPH-MCNC: 4.5 G/DL
ALP BLD-CCNC: 129 U/L
ALT SERPL-CCNC: 51 U/L
ANION GAP SERPL CALC-SCNC: 11 MMOL/L
AST SERPL-CCNC: 46 U/L
BILIRUB SERPL-MCNC: 0.4 MG/DL
BUN SERPL-MCNC: 7 MG/DL
CALCIUM SERPL-MCNC: 9.9 MG/DL
CHLORIDE SERPL-SCNC: 106 MMOL/L
CO2 SERPL-SCNC: 25 MMOL/L
CREAT SERPL-MCNC: 0.88 MG/DL
EGFR: 78 ML/MIN/1.73M2
ESTIMATED AVERAGE GLUCOSE: 140 MG/DL
GLUCOSE SERPL-MCNC: 107 MG/DL
HAV IGM SER QL: NONREACTIVE
HBA1C MFR BLD HPLC: 6.5 %
HBV CORE IGM SER QL: NONREACTIVE
HBV SURFACE AG SER QL: NONREACTIVE
HCV AB SER QL: NONREACTIVE
HCV S/CO RATIO: 0.12 S/CO
POTASSIUM SERPL-SCNC: 4.7 MMOL/L
PROT SERPL-MCNC: 7.2 G/DL
SODIUM SERPL-SCNC: 142 MMOL/L

## 2022-08-30 NOTE — ASSESSMENT
[FreeTextEntry1] : -PMH: T2DM w/ Neuropathy, HTN, HLD, GERD, Asthma, H/o TIA (Small Lacunar Infarct), Depression\par -SH: . 1 children. Employed. Current smoker. Occasional EtOH use. \par \par ROXI is a 57 year F whom is here today for f/u HTN, HLD, T2DM, Depression, Elevated LFTs w/ Hepatomegaly, GB Polyp\par \par Specialists Involved:\par -Cardio: Dr. Villafana ([phone])\par -OBGYN: Dr. Wu ([phone])\par -Podiatry: [phone])\par -GI: Dr. Woods ([phone])\par -Neuro: Dr. Ferraro\par \par -F/u labs drawn in office today\par -Further recs pending lab results\par -Continue f/u w/ Cardio (HTN, HLD)\par -Continue f/u w/ Neuro (Neuropathy & H/o TIA)\par -Continue annual f/u w/ Gyn\par -RTO 4mo for routine f/u or sooner if needed

## 2022-08-30 NOTE — HISTORY OF PRESENT ILLNESS
[FreeTextEntry1] : HTN, HLD, T2DM, Depression, Elevated LFTs w/ Hepatomegaly, GB Polyp [de-identified] : -PMH: T2DM w/ Neuropathy, Hepatic Steatosis w/ Hepatomegaly, GB Polyp, HTN, HLD, GERD, Asthma, H/o TIA (Small Lacunar Infarct), Depression\par -SH: . 1 children. Employed. Current smoker. Occasional EtOH use. \par \par ROXI is a 57 year F whom is here today for f/u HTN, HLD, T2DM, Depression, Elevated LFTs w/ Hepatomegaly, GB Polyp\par Today, pt reports feeling well \par \par (7/30/22) Abd US: Hepatomegaly with diffuse fatty infiltration of the liver. Spleen is prominent. Small 0.6 cm gallbladder polyp which followup is recommended\par \par Given these ultrasound findings in addition to slightly elevated LFTs, pt was referred to GI \par \par -T2DM: Remains on Metformin ER 1000mg QD. On ASA & Statin. Compliant w/ BG monitoring. (6/2022) A1c 6.4. (12/2021) Optho Eval: No Retinopathy. No reported changes.\par -Diabetic Neuropathy: Now resolved. \par \par -HTN: Remains on Losartan 25mg QD, Labetalol 100mg BID & Amlodipine 5mg QD. Follows w/ Cardio. No reported changes\par -HLD & HyperTG: Remains on Atorvastatin 40mg HS & Ezetimibe 10mg QD. No reported changes.\par \par -GERD: Remains on Pantoprazole 40mg. Last EGD ~2yrs ago. No reported changes\par -Asthma: Remains on Montelukast 10mh QD & PRN Albuterol Inh. No reported changes\par \par -Depression, Current Smoker: Smoking 1PPD. On Bupropion 300mg ER QD as per Cardio.

## 2022-08-30 NOTE — ADDENDUM
[FreeTextEntry1] : Diabetes controlled on current medication. A1c 6.5. Goal A1c less than 7.\par \par Persistently elevated liver enzymes for which a GI consultation evaluation is recommended

## 2022-08-31 ENCOUNTER — NON-APPOINTMENT (OUTPATIENT)
Age: 57
End: 2022-08-31

## 2022-09-01 ENCOUNTER — APPOINTMENT (OUTPATIENT)
Dept: INTERNAL MEDICINE | Facility: CLINIC | Age: 57
End: 2022-09-01

## 2022-09-01 VITALS
HEIGHT: 68 IN | HEART RATE: 82 BPM | SYSTOLIC BLOOD PRESSURE: 120 MMHG | TEMPERATURE: 97.2 F | BODY MASS INDEX: 33.65 KG/M2 | OXYGEN SATURATION: 98 % | DIASTOLIC BLOOD PRESSURE: 68 MMHG | WEIGHT: 222 LBS

## 2022-09-01 PROCEDURE — 99213 OFFICE O/P EST LOW 20 MIN: CPT

## 2022-09-01 NOTE — HISTORY OF PRESENT ILLNESS
[FreeTextEntry8] : -PMH: T2DM w/ Neuropathy, HTN, HLD, GERD, Asthma, H/o TIA (Small Lacunar Infarct), Depression\par -SH: . 1 children. Employed. Current smoker. Occasional EtOH use. \par \par ROXI is a 57 year F whom is here today w/ c/o swelling, warmth & redness over her left deltoid first note on AUg 30ths and has since increased in size\par Of note, pt was administered Prevnar 20 to the effected area on 8/29/22\par Denies fever, chills, night sweats \par denies h/o MRSA infxn

## 2022-09-01 NOTE — PHYSICAL EXAM
[Normal] : no rash [de-identified] : erythema w/ streaking on left tricep area with increased warmth. no tenderness

## 2022-09-21 ENCOUNTER — APPOINTMENT (OUTPATIENT)
Dept: GASTROENTEROLOGY | Facility: CLINIC | Age: 57
End: 2022-09-21

## 2022-09-21 VITALS
DIASTOLIC BLOOD PRESSURE: 72 MMHG | WEIGHT: 224 LBS | HEIGHT: 68 IN | TEMPERATURE: 98.1 F | RESPIRATION RATE: 16 BRPM | HEART RATE: 77 BPM | SYSTOLIC BLOOD PRESSURE: 146 MMHG | BODY MASS INDEX: 33.95 KG/M2 | OXYGEN SATURATION: 98 %

## 2022-09-21 DIAGNOSIS — K76.0 FATTY (CHANGE OF) LIVER, NOT ELSEWHERE CLASSIFIED: ICD-10-CM

## 2022-09-21 PROCEDURE — 99205 OFFICE O/P NEW HI 60 MIN: CPT

## 2022-09-21 RX ORDER — SULFAMETHOXAZOLE AND TRIMETHOPRIM 800; 160 MG/1; MG/1
800-160 TABLET ORAL TWICE DAILY
Qty: 14 | Refills: 0 | Status: DISCONTINUED | COMMUNITY
Start: 2022-09-01 | End: 2022-09-21

## 2022-09-21 RX ORDER — MELOXICAM 15 MG/1
15 TABLET ORAL DAILY
Qty: 14 | Refills: 0 | Status: DISCONTINUED | COMMUNITY
Start: 2022-05-13 | End: 2022-09-21

## 2022-09-21 RX ORDER — DICLOFENAC SODIUM 50 MG/1
50 TABLET, DELAYED RELEASE ORAL
Qty: 60 | Refills: 0 | Status: DISCONTINUED | COMMUNITY
Start: 2022-05-18 | End: 2022-09-21

## 2022-09-21 NOTE — PHYSICAL EXAM
[Non-Tender] : non-tender [Smooth] : smooth [General Appearance - Alert] : alert [General Appearance - In No Acute Distress] : in no acute distress [Sclera] : the sclera and conjunctiva were normal [Outer Ear] : the ears and nose were normal in appearance [Oropharynx] : the oropharynx was normal [Neck Appearance] : the appearance of the neck was normal [Auscultation Breath Sounds / Voice Sounds] : lungs were clear to auscultation bilaterally [Heart Rate And Rhythm] : heart rate was normal and rhythm regular [Edema] : there was no peripheral edema [Bowel Sounds] : normal bowel sounds [Abdomen Soft] : soft [Abdomen Tenderness] : non-tender [Abdomen Mass (___ Cm)] : no abdominal mass palpated [Abnormal Walk] : normal gait [Nail Clubbing] : no clubbing  or cyanosis of the fingernails [Musculoskeletal - Swelling] : no joint swelling seen [Motor Tone] : muscle strength and tone were normal [Skin Color & Pigmentation] : normal skin color and pigmentation [Skin Turgor] : normal skin turgor [] : no rash [No Focal Deficits] : no focal deficits [Oriented To Time, Place, And Person] : oriented to person, place, and time [Impaired Insight] : insight and judgment were intact [Affect] : the affect was normal [Scleral Icterus] : No Scleral Icterus [Hepatojugular Reflux] : patient did not have a sustained hepatojugular reflux [Spider Angioma] : No spider angioma(s) were observed [Asterixis] : no asterixis observed [Jaundice] : No jaundice [Palmar Erythema] : no Palmar Erythema [Depression] : no depression [Hallucinations] : ~T no ~M hallucinations

## 2022-09-21 NOTE — HISTORY OF PRESENT ILLNESS
[de-identified] : ROXI LORENZANA is a 57 year old female with a PMH significant for DM w/ Neuropathy, HTN, HLD, GERD, Asthma, H/O TIA (Small Lacunar Infarct), and Depression. \par \par Pt was referred by her PCP for elevated LFTs. On chart review, LFTs elevated as far back as 2021. US abdomen done in July 2022 which showed fatty liver, hepatomegaly, prominent spleen and 0.6 cm gallbladder polyp. In the last year, pt reports she was started on Losartan. Denies fatigue, malaise, arthralgias, myalgias, pruritus, recent infection, abdominal pain or distension, jaundice, hematemesis, hematochezia, dark urine, confusion, unintentional weight loss or gain. \par \par Denies family history of liver disease, sudden death or late trimester miscarriages. Denies alcohol consumption or tattoos. Pt takes multiple vitamin supplements including multivitamin, b12, EBN3, and vitamin D for many years. Vitamin B12 levels in 2021 >2000. Pt's BMI is 33.\par \par Labs 8/29/22 - bilirubin 0.4, AST 46, ALT 51, AlkPhos 129, viral hepatitis panel negative, TFT normal.\par \par US abdomen 7/30/22 - hepatomegaly (18.9 cm) with diffuse fatty infiltration of the liver, prominent spleen and 0.6 cm gallbladder polyp.

## 2022-09-21 NOTE — ASSESSMENT
[FreeTextEntry1] : ROXI LORENZANA is a 57 year old female with a PMH significant for DM w/ Neuropathy, HTN, HLD, GERD, Asthma, H/O TIA (Small Lacunar Infarct), and Depression. \par \par Labs ordered to rule out competing etiologies of liver disease.\par Most likely nonalcoholic fatty liver disease.\par Etiology of fatty liver disease explained to pt in detail along with complications of disease progression.\par \par Recommend lifestyle modifications in form of diet and exercise.\par High protein, low fat, low sugar, low salt (up to 2G/day) diet\par Weight loss of 10% of current body weight.\par Increase physical activity.\par Copy of Mediterranean diet given.\par Stop vitamin supplements, except Vitamin D.\par \par If LFTs remain elevated despite weight loss and stopping the vitamin supplements, we will consider Liver Biopsy.\par \par Follow up in 9 months - 1 year with labs and imaging or sooner if needed.\par

## 2022-09-26 ENCOUNTER — APPOINTMENT (OUTPATIENT)
Dept: PHYSICAL MEDICINE AND REHAB | Facility: CLINIC | Age: 57
End: 2022-09-26

## 2022-09-28 ENCOUNTER — NON-APPOINTMENT (OUTPATIENT)
Age: 57
End: 2022-09-28

## 2022-12-05 NOTE — HISTORY OF PRESENT ILLNESS
[FreeTextEntry1] : HTN, HLD, T2DM, Depression, Elevated LFTs w/ Hepatomegaly [de-identified] : Rebeca is a 58 yo T2DM w/ Neuropathy, Hepatic Steatosis w/ Hepatomegaly, GB Polyp, HTN, HLD, GERD, Asthma, H/o TIA (Small Lacunar Infarct), Depression\par \par

## 2022-12-06 ENCOUNTER — APPOINTMENT (OUTPATIENT)
Dept: INTERNAL MEDICINE | Facility: CLINIC | Age: 57
End: 2022-12-06

## 2023-02-22 ENCOUNTER — NON-APPOINTMENT (OUTPATIENT)
Age: 58
End: 2023-02-22

## 2023-02-23 ENCOUNTER — APPOINTMENT (OUTPATIENT)
Dept: INTERNAL MEDICINE | Facility: CLINIC | Age: 58
End: 2023-02-23
Payer: COMMERCIAL

## 2023-02-23 VITALS
OXYGEN SATURATION: 98 % | RESPIRATION RATE: 16 BRPM | HEART RATE: 73 BPM | WEIGHT: 223 LBS | SYSTOLIC BLOOD PRESSURE: 140 MMHG | HEIGHT: 68 IN | TEMPERATURE: 97.3 F | DIASTOLIC BLOOD PRESSURE: 70 MMHG | BODY MASS INDEX: 33.8 KG/M2

## 2023-02-23 DIAGNOSIS — L03.114 CELLULITIS OF LEFT UPPER LIMB: ICD-10-CM

## 2023-02-23 DIAGNOSIS — J45.909 UNSPECIFIED ASTHMA, UNCOMPLICATED: ICD-10-CM

## 2023-02-23 DIAGNOSIS — Z86.39 PERSONAL HISTORY OF OTHER ENDOCRINE, NUTRITIONAL AND METABOLIC DISEASE: ICD-10-CM

## 2023-02-23 DIAGNOSIS — Z13.29 ENCOUNTER FOR SCREENING FOR OTHER SUSPECTED ENDOCRINE DISORDER: ICD-10-CM

## 2023-02-23 PROCEDURE — 99214 OFFICE O/P EST MOD 30 MIN: CPT | Mod: 25

## 2023-02-23 RX ORDER — OMEGA-3/DHA/EPA/FISH OIL 300-1000MG
1000 CAPSULE ORAL
Qty: 90 | Refills: 1 | Status: DISCONTINUED | COMMUNITY
Start: 2020-11-06 | End: 2023-02-23

## 2023-02-23 NOTE — ASSESSMENT
[FreeTextEntry1] : -PMH: T2DM w/ Neuropathy, Hepatic Steatosis w/ Hepatomegaly, GB Polyp, HTN, HLD, GERD, Asthma, H/o TIA (Small Lacunar Infarct), Depression\par -SH: . 1 children. Employed. Current smoker. Occasional EtOH use. \par \par ROXI is a 57 year F whom is here today for f/u HTN, HLD, T2DM, Depression\par \par Specialists Involved:\par -Cardio: Dr. Villafana ([phone])\par -OBGYN: Dr. Wu ([phone])\par -Podiatry: [phone])\par -GI: Dr. Woods ([phone])\par -Neuro: Dr. Ferraro\par \par -F/u labs drawn in office today\par -Further recs pending lab results\par -Continue f/u w/ Cardio (HTN, HLD)\par -Continue f/u w/ Neuro (Neuropathy & H/o TIA/CVA)\par -Continue annual f/u w/ Gyn\par -RTO 3mo \par -RTO 6mo for CPE or sooner if needed

## 2023-02-23 NOTE — HISTORY OF PRESENT ILLNESS
[FreeTextEntry1] :  f/u HTN, HLD, T2DM, Depression [de-identified] : -PMH: T2DM w/ Neuropathy, Hepatic Steatosis w/ Hepatomegaly, GB Polyp, HTN, HLD, GERD, Asthma, H/o TIA (Small Lacunar Infarct), Depression\par -SH: . 1 children. Employed. Current smoker. Occasional EtOH use. \par \par ROXI is a 57 year F whom is here today for f/u HTN, HLD, T2DM, Depression\par Today, pt reports feeling well \par Since last visit, pt has consulted with hepatology. Per documentation If LFTs remain elevated despite weight loss and stopping the vitamin supplements, we will consider Liver Biopsy.\par \par -T2DM: Remains on Metformin ER 1000mg QD. On ASA & Statin. Compliant w/ BG monitoring. (8/2022) A1c 6.5. (12/2021) Optho Eval: No Retinopathy. No reported changes.\par -Diabetic Neuropathy: Now resolved. \par \par -HTN: Remains on Losartan 25mg QD, Labetalol 100mg BID & Amlodipine 5mg QD. Follows w/ Cardio. No reported changes\par -HLD & HyperTG: Remains on Atorvastatin 40mg HS & Ezetimibe 10mg QD. No reported changes.\par \par -GERD: Remains on Pantoprazole 40mg. Last EGD ~2yrs ago. No reported changes\par -Asthma: Remains on Montelukast 10mh QD & PRN Albuterol Inh. No reported changes\par \par -Depression, Current Smoker: Smoking 1PPD. On Bupropion 300mg ER QD as per Cardio.

## 2023-02-24 LAB
ALBUMIN SERPL ELPH-MCNC: 4.4 G/DL
ALP BLD-CCNC: 137 U/L
ALT SERPL-CCNC: 43 U/L
ANION GAP SERPL CALC-SCNC: 18 MMOL/L
AST SERPL-CCNC: 52 U/L
BILIRUB SERPL-MCNC: 0.3 MG/DL
BUN SERPL-MCNC: 11 MG/DL
CALCIUM SERPL-MCNC: 9.9 MG/DL
CHLORIDE SERPL-SCNC: 105 MMOL/L
CHOLEST SERPL-MCNC: 175 MG/DL
CO2 SERPL-SCNC: 17 MMOL/L
CREAT SERPL-MCNC: 0.76 MG/DL
CREAT SPEC-SCNC: 36 MG/DL
EGFR: 92 ML/MIN/1.73M2
ESTIMATED AVERAGE GLUCOSE: 143 MG/DL
GLUCOSE SERPL-MCNC: 153 MG/DL
HBA1C MFR BLD HPLC: 6.6 %
HDLC SERPL-MCNC: 33 MG/DL
LDLC SERPL CALC-MCNC: 109 MG/DL
MICROALBUMIN 24H UR DL<=1MG/L-MCNC: <1.2 MG/DL
MICROALBUMIN/CREAT 24H UR-RTO: NORMAL MG/G
NONHDLC SERPL-MCNC: 142 MG/DL
POTASSIUM SERPL-SCNC: 5.2 MMOL/L
PROT SERPL-MCNC: 7.6 G/DL
SODIUM SERPL-SCNC: 139 MMOL/L
TRIGL SERPL-MCNC: 165 MG/DL
VIT B12 SERPL-MCNC: 964 PG/ML

## 2023-03-01 ENCOUNTER — APPOINTMENT (OUTPATIENT)
Dept: NEUROLOGY | Facility: CLINIC | Age: 58
End: 2023-03-01
Payer: COMMERCIAL

## 2023-03-01 VITALS
DIASTOLIC BLOOD PRESSURE: 80 MMHG | SYSTOLIC BLOOD PRESSURE: 134 MMHG | HEIGHT: 68 IN | BODY MASS INDEX: 33.8 KG/M2 | WEIGHT: 223 LBS

## 2023-03-01 PROCEDURE — 99213 OFFICE O/P EST LOW 20 MIN: CPT

## 2023-03-01 NOTE — DATA REVIEWED
[de-identified] : Brain MRI was performed on 2/13/19 at [facility].\par The study demonstrated a chronic lacunar infarct in the left superior frontal region.\par There is also some chronic ischemic change. [de-identified] : Nerve conduction studies demonstrated some peripheral neuropathy likely secondary to her diabetes.\par \par MRI of the lumbar spine was performed at [facility] on 2/5/18. \par The study demonstrated multilevel degenerative changes and disc bulges.\par

## 2023-03-01 NOTE — ASSESSMENT
[FreeTextEntry1] : This is a 57 year-old woman with a history of lower back pains. She has seen a spinal specialist for this.\par \par She also has symptoms of carpal tunnel syndrome for which she has been wearing wrist braces at night.\par She has seen an orthopedist for this and reports that she was told that surgery was an option if the pain becomes unbearable.\par \par She also has some mild findings of neuropathy on nerve conduction studies.\par I had advised her to maintain good blood sugar control. \par \par She had an episode of syncope and a small lacunar infarct was seen on brain MRI.\par Likely this is secondary to small vessel disease.\par I would recommend she continue antiplatelet statin medication.\par I again advised her to discontinue smoking.\par \par I had given her some simple exercises she could do at home for the dizziness.\par \par I will see her back in 6 months.\par \par

## 2023-03-06 ENCOUNTER — NON-APPOINTMENT (OUTPATIENT)
Age: 58
End: 2023-03-06

## 2023-05-24 ENCOUNTER — APPOINTMENT (OUTPATIENT)
Dept: INTERNAL MEDICINE | Facility: CLINIC | Age: 58
End: 2023-05-24
Payer: COMMERCIAL

## 2023-05-24 VITALS
BODY MASS INDEX: 34.4 KG/M2 | SYSTOLIC BLOOD PRESSURE: 146 MMHG | WEIGHT: 227 LBS | HEART RATE: 80 BPM | TEMPERATURE: 97.2 F | HEIGHT: 68 IN | RESPIRATION RATE: 16 BRPM | DIASTOLIC BLOOD PRESSURE: 79 MMHG | OXYGEN SATURATION: 99 %

## 2023-05-24 LAB — HBA1C MFR BLD HPLC: 6.3

## 2023-05-24 PROCEDURE — 99214 OFFICE O/P EST MOD 30 MIN: CPT | Mod: 25

## 2023-05-24 PROCEDURE — 83036 HEMOGLOBIN GLYCOSYLATED A1C: CPT | Mod: QW

## 2023-05-24 NOTE — HISTORY OF PRESENT ILLNESS
[FreeTextEntry1] :  f/u HTN, HLD, T2DM, Depression [de-identified] : -PMH: T2DM w/ Neuropathy, Hepatic Steatosis w/ Hepatomegaly, GB Polyp, HTN, HLD, GERD, Asthma, H/o TIA (Small Lacunar Infarct), Depression\par -SH: . 1 children. Employed. Current smoker. Occasional EtOH use. \par \par ROXI is a 57 year F whom is here today for f/u HTN, HLD, T2DM, Depression\par Today, pt reports feeling well \par \par -T2DM: Remains on Metformin ER 1000mg QD. On ASA & Statin. Compliant w/ BG monitoring. (8/2022) A1c 6.5. (5/2023) Optho Eval: No Retinopathy. No reported changes.\par -Diabetic Neuropathy: Now resolved. \par \par -HTN: Remains on Losartan 25mg QD, Labetalol 100mg BID & Amlodipine 5mg QD. Follows w/ Cardio. No reported changes\par -HLD & HyperTG: Remains on Atorvastatin 40mg HS & Ezetimibe 10mg QD. No reported changes.\par \par -GERD: Remains on Pantoprazole 40mg. Last EGD ~2yrs ago. No reported changes\par -Asthma: Remains on Montelukast 10mh QD & PRN Albuterol Inh. No reported changes\par \par -Depression, Current Smoker: Smoking 1PPD. On Bupropion 300mg ER QD as per Cardio.

## 2023-05-24 NOTE — ASSESSMENT
[FreeTextEntry1] : -PMH: T2DM w/ Neuropathy, Hepatic Steatosis w/ Hepatomegaly, GB Polyp, HTN, HLD, GERD, Asthma, H/o TIA (Small Lacunar Infarct), Depression\par -SH: . 1 children. Employed. Current smoker. Occasional EtOH use. \par \par ROXI is a 57 year F whom is here today for f/u HTN, HLD, T2DM, Depression\par \par Specialists Involved:\par -Cardio: Dr. Villafana ([phone])\par -OBGYN: Dr. Wu ([phone])\par -Podiatry: [phone])\par -GI: Dr. Woods ([phone])\par -Neuro: Dr. Ferraro\par \par -A1c checked in office\par -Continue f/u w/ Cardio (HTN, HLD)\par -Continue f/u w/ Neuro (Neuropathy & H/o TIA/CVA)\par -Continue annual f/u w/ Gyn\par -RTO 3mo \par -RTO 6mo for CPE or sooner if needed

## 2023-06-22 ENCOUNTER — NON-APPOINTMENT (OUTPATIENT)
Age: 58
End: 2023-06-22

## 2023-06-27 ENCOUNTER — APPOINTMENT (OUTPATIENT)
Dept: GASTROENTEROLOGY | Facility: CLINIC | Age: 58
End: 2023-06-27
Payer: COMMERCIAL

## 2023-06-27 VITALS
OXYGEN SATURATION: 98 % | BODY MASS INDEX: 35.46 KG/M2 | HEIGHT: 68 IN | HEART RATE: 96 BPM | SYSTOLIC BLOOD PRESSURE: 170 MMHG | WEIGHT: 234 LBS | RESPIRATION RATE: 14 BRPM | DIASTOLIC BLOOD PRESSURE: 98 MMHG

## 2023-06-27 PROCEDURE — 99213 OFFICE O/P EST LOW 20 MIN: CPT

## 2023-07-03 ENCOUNTER — NON-APPOINTMENT (OUTPATIENT)
Age: 58
End: 2023-07-03

## 2023-07-03 ENCOUNTER — APPOINTMENT (OUTPATIENT)
Dept: INTERNAL MEDICINE | Facility: CLINIC | Age: 58
End: 2023-07-03

## 2023-07-03 DIAGNOSIS — Z00.00 ENCOUNTER FOR GENERAL ADULT MEDICAL EXAMINATION W/OUT ABNORMAL FINDINGS: ICD-10-CM

## 2023-07-03 DIAGNOSIS — Z71.6 TOBACCO ABUSE COUNSELING: ICD-10-CM

## 2023-07-03 DIAGNOSIS — Z87.898 PERSONAL HISTORY OF OTHER SPECIFIED CONDITIONS: ICD-10-CM

## 2023-07-21 NOTE — HISTORY OF PRESENT ILLNESS
[de-identified] : ROXI LORENZANA is a 57 year old female with a PMH significant for DM w/ Neuropathy, HTN, HLD, GERD, Asthma, H/O TIA (Small Lacunar Infarct), and Depression. \par \par \par 6/27/23 : Didnt do labs after first visit.\par No weight loss. Some weight gain\par Hasnt been able to lose weight.\par Pt was referred by her PCP for elevated LFTs. On chart review, LFTs elevated as far back as 2021. US abdomen done in July 2022 which showed fatty liver, hepatomegaly, prominent spleen and 0.6 cm gallbladder polyp. In the last year, pt reports she was started on Losartan. Denies fatigue, malaise, arthralgias, myalgias, pruritus, recent infection, abdominal pain or distension, jaundice, hematemesis, hematochezia, dark urine, confusion, unintentional weight loss or gain. \par \par Denies family history of liver disease, sudden death or late trimester miscarriages. Denies alcohol consumption or tattoos. Pt takes multiple vitamin supplements including multivitamin, b12, EBN3, and vitamin D for many years. Vitamin B12 levels in 2021 >2000. Pt's BMI is 33.\par \par Labs 8/29/22 - bilirubin 0.4, AST 46, ALT 51, AlkPhos 129, viral hepatitis panel negative, TFT normal.\par \par US abdomen 7/30/22 - hepatomegaly (18.9 cm) with diffuse fatty infiltration of the liver, prominent spleen and 0.6 cm gallbladder polyp.

## 2023-07-21 NOTE — ASSESSMENT
[FreeTextEntry1] : ROXI LORENZANA is a 57 year old female with a PMH significant for DM w/ Neuropathy, HTN, HLD, GERD, Asthma, H/O TIA (Small Lacunar Infarct), and Depression. \par \par Labs ordered to rule out competing etiologies of liver disease were not done\par Most likely nonalcoholic fatty liver disease.\par Etiology of fatty liver disease explained to pt in detail along with complications of disease progression.\par \par Recommend lifestyle modifications in form of diet and exercise.\par High protein, low fat, low sugar, low salt (up to 2G/day) diet\par Weight loss of 10% of current body weight.\par Increase physical activity.\par Copy of Mediterranean diet given.\par Stop vitamin supplements, except Vitamin D.\par \par If LFTs remain elevated despite weight loss and stopping the vitamin supplements, we will consider Liver Biopsy.\par \par Follow up in 9 months - 1 year with labs and imaging or sooner if needed.\par

## 2023-08-24 ENCOUNTER — APPOINTMENT (OUTPATIENT)
Dept: INTERNAL MEDICINE | Facility: CLINIC | Age: 58
End: 2023-08-24
Payer: COMMERCIAL

## 2023-08-24 VITALS
BODY MASS INDEX: 38.35 KG/M2 | HEART RATE: 77 BPM | TEMPERATURE: 97.3 F | SYSTOLIC BLOOD PRESSURE: 140 MMHG | OXYGEN SATURATION: 98 % | RESPIRATION RATE: 14 BRPM | HEIGHT: 65.5 IN | DIASTOLIC BLOOD PRESSURE: 80 MMHG | WEIGHT: 233 LBS

## 2023-08-24 DIAGNOSIS — M62.830 MUSCLE SPASM OF BACK: ICD-10-CM

## 2023-08-24 PROCEDURE — 99396 PREV VISIT EST AGE 40-64: CPT | Mod: 25

## 2023-08-24 RX ORDER — CLINDAMYCIN PHOSPHATE 1 G/10ML
1 GEL TOPICAL 3 TIMES DAILY
Qty: 1 | Refills: 1 | Status: ACTIVE | COMMUNITY
Start: 2023-08-24 | End: 1900-01-01

## 2023-08-24 RX ORDER — DOXYCYCLINE HYCLATE 100 MG/1
100 TABLET ORAL TWICE DAILY
Qty: 14 | Refills: 0 | Status: ACTIVE | COMMUNITY
Start: 2023-08-24 | End: 1900-01-01

## 2023-08-24 RX ORDER — CLINDAMYCIN PHOSPHATE 10 MG/ML
1 SOLUTION TOPICAL TWICE DAILY
Qty: 1 | Refills: 1 | Status: DISCONTINUED | COMMUNITY
Start: 2023-02-23 | End: 2023-08-24

## 2023-08-24 RX ORDER — EZETIMIBE 10 MG/1
10 TABLET ORAL
Qty: 90 | Refills: 3 | Status: ACTIVE | COMMUNITY
Start: 2020-11-06 | End: 1900-01-01

## 2023-08-24 RX ORDER — ALBUTEROL SULFATE 90 UG/1
108 (90 BASE) INHALANT RESPIRATORY (INHALATION)
Qty: 1 | Refills: 3 | Status: ACTIVE | COMMUNITY
Start: 2021-02-15 | End: 1900-01-01

## 2023-08-24 NOTE — HISTORY OF PRESENT ILLNESS
[FreeTextEntry1] : Annual well check  [de-identified] : -PMH: T2DM w/ Neuropathy, Hepatic Steatosis w/ Hepatomegaly, GB Polyp, HTN, HLD, GERD, Asthma, H/o TIA (Small Lacunar Infarct), Depression -SH: . 1 children. Employed. Current smoker. Occasional EtOH use.   ROXI is a 58 year F whom is here today for an annual well check  Specialists Involved: -Cardio: Dr. Villafana ([phone]) -OBGYN: Dr. Wu ([phone]) -Podiatry: [phone]) -GI: Dr. Woods ([phone]) -Neuro: Dr. Ferraro  -Vaccines: Needs Shingles -Mammogram: 2022 -Pap Smear: 2023 appointment scheduled -Colonoscopy: 2019. Repeat 5yr -Lung CT CA Screenin2021. Insurance company will not cover it  -FH of Colon, breast or ovarian CA: Pt adopted.   -T2DM: Remains on Metformin ER 1000mg QD. On ASA & Statin. Compliant w/ BG monitoring. (2023) A1c 6.6. (2023) Optho Eval: No Retinopathy. No reported changes.  -HTN: Remains on Losartan 50mg QD, Labetalol 100mg BID & Amlodipine 5mg QD. Follows w/ Cardio. No reported changes -HLD & HyperTG: Remains on Atorvastatin 40mg HS & Ezetimibe 10mg QD. No reported changes.  -GERD: Remains on Pantoprazole 40mg. Last EGD ~2yrs ago. No reported changes -Asthma: Remains on Montelukast 10mh QD & PRN Albuterol Inh. No reported changes  -Depression, Current Smoker: Smoking 1PPD. On Bupropion 300mg ER QD as per Cardio.

## 2023-08-24 NOTE — HEALTH RISK ASSESSMENT
[0] : 2) Feeling down, depressed, or hopeless: Not at all (0) [PHQ-2 Negative - No further assessment needed] : PHQ-2 Negative - No further assessment needed [Audit-CScore] : 1 [de-identified] : Being managed on Bupropion  [[id number]] : 0 [LowDoseCTScan] : 08/23 [EyeExamDate] : 05/23 [Change in mental status noted] : No change in mental status noted [Sexually Active] : not sexually active [Reports changes in hearing] : Reports no changes in hearing [Reports changes in vision] : Reports no changes in vision [MammogramDate] : 07/22 [PapSmearDate] : 03/23 [BoneDensityDate] : 07/21 [ColonoscopyDate] : 08/19 [ColonoscopyComments] : repeat 5yr [AdvancecareDate] : 08/23

## 2023-08-24 NOTE — ASSESSMENT
[FreeTextEntry1] : -PMH: T2DM w/ Neuropathy, Hepatic Steatosis w/ Hepatomegaly, GB Polyp, HTN, HLD, GERD, Asthma, H/o TIA (Small Lacunar Infarct), Depression -SH: . 1 children. Employed. Current smoker. Occasional EtOH use.   ROXI is a 58 year F whom is here today for an annual well check  Specialists Involved: -Cardio: Dr. Villafana ([phone]) -OBGYN: Dr. Wu ([phone]) -Podiatry: [phone]) -GI: Dr. Woods ([phone]) -Neuro: Dr. Ferraro  -F/u labs drawn in office today -Further recs pending lab results -Continue f/u w/ Cardio (HTN, HLD) -Continue f/u w/ Neuro (Neuropathy & H/o TIA/CVA) -Continue annual f/u w/ Gyn -RTO 4- 6mo or sooner if needed.

## 2023-08-25 LAB
ALBUMIN SERPL ELPH-MCNC: 4 G/DL
ALP BLD-CCNC: 133 U/L
ALT SERPL-CCNC: 36 U/L
ANION GAP SERPL CALC-SCNC: 13 MMOL/L
AST SERPL-CCNC: 43 U/L
BILIRUB SERPL-MCNC: 0.2 MG/DL
BUN SERPL-MCNC: 10 MG/DL
CALCIUM SERPL-MCNC: 9.5 MG/DL
CHLORIDE SERPL-SCNC: 106 MMOL/L
CHOLEST SERPL-MCNC: 149 MG/DL
CO2 SERPL-SCNC: 24 MMOL/L
CREAT SERPL-MCNC: 0.82 MG/DL
CREAT SPEC-SCNC: 64 MG/DL
EGFR: 84 ML/MIN/1.73M2
ESTIMATED AVERAGE GLUCOSE: 166 MG/DL
GLUCOSE SERPL-MCNC: 146 MG/DL
HBA1C MFR BLD HPLC: 7.4 %
HDLC SERPL-MCNC: 30 MG/DL
LDLC SERPL CALC-MCNC: 99 MG/DL
MICROALBUMIN 24H UR DL<=1MG/L-MCNC: <1.2 MG/DL
MICROALBUMIN/CREAT 24H UR-RTO: NORMAL MG/G
NONHDLC SERPL-MCNC: 119 MG/DL
POTASSIUM SERPL-SCNC: 5.1 MMOL/L
PROT SERPL-MCNC: 7.1 G/DL
SODIUM SERPL-SCNC: 142 MMOL/L
TRIGL SERPL-MCNC: 107 MG/DL

## 2023-08-30 ENCOUNTER — NON-APPOINTMENT (OUTPATIENT)
Age: 58
End: 2023-08-30

## 2023-08-30 ENCOUNTER — APPOINTMENT (OUTPATIENT)
Dept: NEUROLOGY | Facility: CLINIC | Age: 58
End: 2023-08-30
Payer: COMMERCIAL

## 2023-08-30 VITALS — DIASTOLIC BLOOD PRESSURE: 78 MMHG | SYSTOLIC BLOOD PRESSURE: 130 MMHG

## 2023-08-30 VITALS
DIASTOLIC BLOOD PRESSURE: 78 MMHG | SYSTOLIC BLOOD PRESSURE: 130 MMHG | HEIGHT: 65.5 IN | WEIGHT: 230 LBS | BODY MASS INDEX: 37.86 KG/M2

## 2023-08-30 PROCEDURE — 99214 OFFICE O/P EST MOD 30 MIN: CPT

## 2023-08-30 NOTE — PHYSICAL EXAM
[General Appearance - Alert] : alert [General Appearance - In No Acute Distress] : in no acute distress [Oriented To Time, Place, And Person] : oriented to person, place, and time [Affect] : the affect was normal [Memory Recent] : recent memory was not impaired [Memory Remote] : remote memory was not impaired [Dysarthria] : no dysarthria [Aphasia] : no dysphasia/aphasia [Cranial Nerves Optic (II)] : visual acuity intact bilaterally,  visual fields full to confrontation, pupils equal round and reactive to light [Cranial Nerves Oculomotor (III)] : extraocular motion intact [Cranial Nerves Trigeminal (V)] : facial sensation intact symmetrically [Cranial Nerves Facial (VII)] : face symmetrical [Cranial Nerves Vestibulocochlear (VIII)] : hearing was intact bilaterally [Cranial Nerves Glossopharyngeal (IX)] : tongue and palate midline [Cranial Nerves Accessory (XI - Cranial And Spinal)] : head turning and shoulder shrug symmetric [Cranial Nerves Hypoglossal (XII)] : there was no tongue deviation with protrusion [Motor Tone] : muscle tone was normal in all four extremities [Motor Strength] : muscle strength was normal in all four extremities [Sensation Tactile Decrease] : light touch was intact [Sensation Pain / Temperature Decrease] : pain and temperature was intact [Sensation Vibration Decrease] : vibration was intact [Romberg's Sign] : Romberg's sign was negtive [Abnormal Walk] : normal gait [Coordination - Dysmetria Impaired Finger-to-Nose Bilateral] : not present [1+] : Patella left 1+ [Plantar Reflex Right Only] : normal on the right [Plantar Reflex Left Only] : normal on the left [Optic Disc Abnormality] : the optic disc were normal in size and color [Edema] : there was no peripheral edema [Involuntary Movements] : no involuntary movements were seen [FreeTextEntry1] : Tinel sign is present at the wrists bilaterally.

## 2023-08-30 NOTE — ASSESSMENT
[FreeTextEntry1] : This is a 58 year-old woman with a history of lower back pains. She has seen a spinal specialist for this.  She also has symptoms of carpal tunnel syndrome for which she has been wearing wrist braces at night. She has seen an orthopedist for this and reports that she was told that surgery was an option if the pain becomes unbearable.  She also has some mild findings of neuropathy on nerve conduction studies. I had advised her to maintain good blood sugar control.   She had an episode of syncope, and a small lacunar infarct was seen on brain MRI. Likely this is secondary to small vessel disease. I would recommend she continue antiplatelet statin medication. I again advised her to discontinue smoking.  I had given her some simple exercises she could do at home for the dizziness.  I will see her back in 6 months.

## 2023-08-30 NOTE — DATA REVIEWED
[de-identified] : Brain MRI was performed on 2/13/19 at [facility].\par  The study demonstrated a chronic lacunar infarct in the left superior frontal region.\par  There is also some chronic ischemic change. [de-identified] : Nerve conduction studies demonstrated some peripheral neuropathy likely secondary to her diabetes.\par  \par  MRI of the lumbar spine was performed at [facility] on 2/5/18. \par  The study demonstrated multilevel degenerative changes and disc bulges.\par

## 2023-08-30 NOTE — HISTORY OF PRESENT ILLNESS
[FreeTextEntry1] : I saw this patient in the office today.  As you recall, she described a long history of lower back pain and sciatica on the right side.  This has been going on for many years. It had become worse and began to radiate down the right leg into the foot. It is quite severe. It is associated with some numbness and tingling in the right leg. She reports that occasionally she will notice improvement if she changes position. She reports that she went for pain management injection and has had good relief. She states her pain management specialist no longer takes her insurance. I will give her a referral for the local pain management specialist that we refer to.  She also has had symptoms of carpal tunnel syndrome in both hands for the past several years. This had been somewhat worse and she has seen an orthopedic specialist for it.  She was found to have neuropathy on nerve conduction study.  In early January of 2019 she passed out briefly. She has had no further episodes. She was sent for an MRI of the brain. She was noted to have a small lacunar infarct that appeared chronic.  8/30/2023 visit:

## 2023-10-04 ENCOUNTER — LABORATORY RESULT (OUTPATIENT)
Age: 58
End: 2023-10-04

## 2023-10-05 LAB
A1AT SERPL-MCNC: 155 MG/DL
AFP-TM SERPL-MCNC: <1.8 NG/ML
ALBUMIN SERPL ELPH-MCNC: 4.1 G/DL
ALP BLD-CCNC: 126 U/L
ALT SERPL-CCNC: 33 U/L
ANION GAP SERPL CALC-SCNC: 15 MMOL/L
AST SERPL-CCNC: 42 U/L
BILIRUB INDIRECT SERPL-MCNC: 0.2 MG/DL
BILIRUB SERPL-MCNC: 0.2 MG/DL
BUN SERPL-MCNC: 14 MG/DL
CALCIUM SERPL-MCNC: 9.5 MG/DL
CERULOPLASMIN SERPL-MCNC: 34 MG/DL
CHLORIDE SERPL-SCNC: 104 MMOL/L
CO2 SERPL-SCNC: 23 MMOL/L
CREAT SERPL-MCNC: 0.88 MG/DL
EGFR: 77 ML/MIN/1.73M2
FERRITIN SERPL-MCNC: 56 NG/ML
GGT SERPL-CCNC: 59 U/L
GLUCOSE SERPL-MCNC: 126 MG/DL
HBV CORE IGG+IGM SER QL: NONREACTIVE
INR PPP: 1.25 RATIO
IRON SATN MFR SERPL: 12 %
IRON SERPL-MCNC: 46 UG/DL
NT-PROBNP SERPL-MCNC: 57 PG/ML
POTASSIUM SERPL-SCNC: 4.4 MMOL/L
PROT SERPL-MCNC: 7.3 G/DL
PT BLD: 14 SEC
SODIUM SERPL-SCNC: 141 MMOL/L
TIBC SERPL-MCNC: 379 UG/DL
TSH SERPL-ACNC: 1.95 UIU/ML
UIBC SERPL-MCNC: 333 UG/DL

## 2023-10-13 LAB
A1AT PHENOTYP SERPL-IMP: NORMAL
A1AT SERPL-MCNC: 153 MG/DL
ALP BLD-CCNC: 122 IU/L
ALP BONE CFR SERPL: 27 %
ALP INTEST CFR SERPL: 2 %
ALP LIVER CFR SERPL: 71 %
ANA PAT FLD IF-IMP: ABNORMAL
ANA SER IF-ACNC: ABNORMAL
COPPER 24H UR-MCNC: NORMAL
COPPER UR-MCNC: 17 UG/L
COPPER/CREATININE RATIO: 9 UG/G CREAT
CREATININE, URINE: 1.79 G/L
DEPRECATED KAPPA LC FREE/LAMBDA SER: 1.51 RATIO
IGA SER QL IEP: 318 MG/DL
IGG SER QL IEP: 988 MG/DL
IGM SER QL IEP: 326 MG/DL
KAPPA LC CSF-MCNC: 2.72 MG/DL
KAPPA LC SERPL-MCNC: 4.1 MG/DL
LKM AB SER QL IF: <20.1 UNITS
MITOCHONDRIA AB SER IF-ACNC: NORMAL
SMOOTH MUSCLE AB SER QL IF: NORMAL
SOLUBLE LIVER IGG SER IA-ACNC: 1.1

## 2023-10-16 RX ORDER — TIRZEPATIDE 2.5 MG/.5ML
2.5 INJECTION, SOLUTION SUBCUTANEOUS
Qty: 4 | Refills: 2 | Status: DISCONTINUED | COMMUNITY
Start: 2023-08-24 | End: 2023-10-16

## 2023-10-31 ENCOUNTER — NON-APPOINTMENT (OUTPATIENT)
Age: 58
End: 2023-10-31

## 2023-11-10 ENCOUNTER — APPOINTMENT (OUTPATIENT)
Dept: INTERNAL MEDICINE | Facility: CLINIC | Age: 58
End: 2023-11-10
Payer: COMMERCIAL

## 2023-11-10 VITALS
RESPIRATION RATE: 14 BRPM | WEIGHT: 225 LBS | HEART RATE: 89 BPM | SYSTOLIC BLOOD PRESSURE: 140 MMHG | OXYGEN SATURATION: 98 % | TEMPERATURE: 97.6 F | DIASTOLIC BLOOD PRESSURE: 70 MMHG | HEIGHT: 65.5 IN | BODY MASS INDEX: 37.04 KG/M2

## 2023-11-10 DIAGNOSIS — L73.2 HIDRADENITIS SUPPURATIVA: ICD-10-CM

## 2023-11-10 DIAGNOSIS — R16.0 HEPATOMEGALY, NOT ELSEWHERE CLASSIFIED: ICD-10-CM

## 2023-11-10 PROCEDURE — 99214 OFFICE O/P EST MOD 30 MIN: CPT | Mod: 25

## 2023-11-13 LAB
ALBUMIN SERPL ELPH-MCNC: 4.4 G/DL
ALP BLD-CCNC: 127 U/L
ALT SERPL-CCNC: 38 U/L
ANION GAP SERPL CALC-SCNC: 11 MMOL/L
AST SERPL-CCNC: 40 U/L
BILIRUB SERPL-MCNC: 0.3 MG/DL
BUN SERPL-MCNC: 10 MG/DL
CALCIUM SERPL-MCNC: 10.1 MG/DL
CHLORIDE SERPL-SCNC: 105 MMOL/L
CHOLEST SERPL-MCNC: 153 MG/DL
CO2 SERPL-SCNC: 24 MMOL/L
CREAT SERPL-MCNC: 0.79 MG/DL
EGFR: 88 ML/MIN/1.73M2
ESTIMATED AVERAGE GLUCOSE: 143 MG/DL
GLUCOSE SERPL-MCNC: 102 MG/DL
HBA1C MFR BLD HPLC: 6.6 %
HDLC SERPL-MCNC: 31 MG/DL
LDLC SERPL CALC-MCNC: 104 MG/DL
NONHDLC SERPL-MCNC: 122 MG/DL
POTASSIUM SERPL-SCNC: 4.8 MMOL/L
PROT SERPL-MCNC: 7.2 G/DL
SODIUM SERPL-SCNC: 140 MMOL/L
TRIGL SERPL-MCNC: 98 MG/DL

## 2023-11-17 ENCOUNTER — NON-APPOINTMENT (OUTPATIENT)
Age: 58
End: 2023-11-17

## 2023-12-15 ENCOUNTER — APPOINTMENT (OUTPATIENT)
Dept: INTERNAL MEDICINE | Facility: CLINIC | Age: 58
End: 2023-12-15
Payer: COMMERCIAL

## 2023-12-15 VITALS
HEART RATE: 91 BPM | BODY MASS INDEX: 36.21 KG/M2 | SYSTOLIC BLOOD PRESSURE: 124 MMHG | OXYGEN SATURATION: 98 % | HEIGHT: 65.5 IN | DIASTOLIC BLOOD PRESSURE: 70 MMHG | WEIGHT: 220 LBS | RESPIRATION RATE: 14 BRPM | TEMPERATURE: 97.3 F

## 2023-12-15 PROCEDURE — 99213 OFFICE O/P EST LOW 20 MIN: CPT

## 2023-12-15 NOTE — HISTORY OF PRESENT ILLNESS
[FreeTextEntry8] : -PMH: T2DM w/ Neuropathy, Hepatic Steatosis w/ Hepatomegaly, GB Polyp, HTN, HLD, GERD, Asthma, H/o TIA (Small Lacunar Infarct), Depression -SH: . 1 children. Employed. Current smoker. Occasional EtOH use.  ROXI is a 58 year F whom is here today w/ c/o foul smelling burps and diarrhea x 1-2 weeks Denies abdominal pain, nausea, vomiting, heartburn, viral illness, recent travel, fever, early satiety Of note, at our last visit we increased the dose of her Ozempic up to 1 mg q. weekly and she remains on metformin  mg daily.

## 2023-12-15 NOTE — ASSESSMENT
[FreeTextEntry1] : Based on the reported history, explanation most likely for the reported halitosis and change in bowel habits is likely a result of increased Ozempic dose Recommend she stop Ozempic and see if her symptoms resolved.  If they do then she will go back to the lower dose of the Ozempic 0.5 mg q. weekly which she was doing well on.  She will follow-up with me definitely in 1 month but sooner if symptoms not improving or worsening. Gastroenterology follow-up also encouraged if symptoms do not resolve.

## 2023-12-21 ENCOUNTER — APPOINTMENT (OUTPATIENT)
Dept: GASTROENTEROLOGY | Facility: CLINIC | Age: 58
End: 2023-12-21
Payer: COMMERCIAL

## 2023-12-21 VITALS
SYSTOLIC BLOOD PRESSURE: 119 MMHG | HEIGHT: 65.5 IN | RESPIRATION RATE: 15 BRPM | OXYGEN SATURATION: 97 % | HEART RATE: 85 BPM | WEIGHT: 214 LBS | DIASTOLIC BLOOD PRESSURE: 70 MMHG | BODY MASS INDEX: 35.23 KG/M2

## 2023-12-21 DIAGNOSIS — Z72.0 TOBACCO USE: ICD-10-CM

## 2023-12-21 PROCEDURE — 99204 OFFICE O/P NEW MOD 45 MIN: CPT

## 2023-12-21 RX ORDER — CYCLOBENZAPRINE HYDROCHLORIDE 10 MG/1
10 TABLET, FILM COATED ORAL
Qty: 30 | Refills: 0 | Status: DISCONTINUED | COMMUNITY
Start: 2023-08-24 | End: 2023-12-21

## 2023-12-21 NOTE — REVIEW OF SYSTEMS
[Fever] : no fever [Chills] : no chills [Feeling Tired] : not feeling tired [Recent Weight Loss (___ Lbs)] : no recent weight loss [Abdominal Pain] : no abdominal pain [Vomiting] : no vomiting [Constipation] : no constipation [Diarrhea] : diarrhea [Heartburn] : no heartburn [Melena (black stool)] : no melena [Bleeding] : no bleeding [Bloating (gassiness)] : no bloating [Negative] : Heme/Lymph

## 2023-12-21 NOTE — HISTORY OF PRESENT ILLNESS
[FreeTextEntry1] : 58-year-old female with a history of diabetes on Ozempic, hepatic steatosis, GERD and asthma who presents for evaluation of several months of foul-smelling burps. She also reports 2 weeks of intermittent diarrhea.  She reports loose explosive stools without blood in her stool.  She denies nausea, vomiting or acid reflux symptoms.  She was seen by her primary care provider 1 day prior to this presentation and appropriately her symptoms were attributed to her Ozempic.  The Ozempic dose was decreased.  She is an everyday smoker.

## 2023-12-21 NOTE — ASSESSMENT
[FreeTextEntry1] :  58-year-old female with a history of diabetes on Ozempic, tobacco use disorder, hepatic steatosis and GERD who presents for evaluation of foul-smelling burps   Foul-smelling burps Her symptoms are most likely related to Ozempic given its delayed gastric emptying this is a common side effect Agree with discontinuing or lowering the dose of the Ozempic if possible I strongly recommend she stop smoking as well as this can also contribute to foul-smell We will plan for endoscopy if her symptoms do not improve  Diarrhea Acute Rule out infectious etiology with stool studies If stool studies are negative for acute infectious etiology can use Imodium or cholestyramine as needed She is up-to-date with colon cancer screening

## 2023-12-21 NOTE — PHYSICAL EXAM
[Alert] : alert [Normal Voice/Communication] : normal voice/communication [No Acute Distress] : no acute distress [Sclera] : the sclera and conjunctiva were normal [Hearing Threshold Finger Rub Not [place]] : hearing was normal [Normal Lips/Gums] : the lips and gums were normal [Oropharynx] : the oropharynx was normal [Normal Appearance] : the appearance of the neck was normal [No Neck Mass] : no neck mass was observed

## 2024-02-20 ENCOUNTER — APPOINTMENT (OUTPATIENT)
Dept: INTERNAL MEDICINE | Facility: CLINIC | Age: 59
End: 2024-02-20
Payer: COMMERCIAL

## 2024-02-20 VITALS
OXYGEN SATURATION: 98 % | SYSTOLIC BLOOD PRESSURE: 130 MMHG | RESPIRATION RATE: 15 BRPM | WEIGHT: 206 LBS | BODY MASS INDEX: 33.91 KG/M2 | HEART RATE: 93 BPM | HEIGHT: 65.5 IN | TEMPERATURE: 97.3 F | DIASTOLIC BLOOD PRESSURE: 70 MMHG

## 2024-02-20 DIAGNOSIS — R19.4 CHANGE IN BOWEL HABIT: ICD-10-CM

## 2024-02-20 DIAGNOSIS — Z87.898 PERSONAL HISTORY OF OTHER SPECIFIED CONDITIONS: ICD-10-CM

## 2024-02-20 DIAGNOSIS — F17.210 NICOTINE DEPENDENCE, CIGARETTES, UNCOMPLICATED: ICD-10-CM

## 2024-02-20 DIAGNOSIS — R92.8 OTHER ABNORMAL AND INCONCLUSIVE FINDINGS ON DIAGNOSTIC IMAGING OF BREAST: ICD-10-CM

## 2024-02-20 PROCEDURE — 99215 OFFICE O/P EST HI 40 MIN: CPT

## 2024-02-20 RX ORDER — METFORMIN ER 500 MG 500 MG/1
500 TABLET ORAL
Qty: 90 | Refills: 1 | Status: ACTIVE | COMMUNITY
Start: 2021-08-19 | End: 1900-01-01

## 2024-02-20 RX ORDER — BUPROPION HYDROCHLORIDE 150 MG/1
150 TABLET, FILM COATED, EXTENDED RELEASE ORAL
Qty: 180 | Refills: 1 | Status: ACTIVE | COMMUNITY
Start: 2020-11-06 | End: 1900-01-01

## 2024-02-20 NOTE — ASSESSMENT
[FreeTextEntry1] :  -F/u labs drawn in office today -Further recs pending lab results -RTO 6mo for CPE or sooner if needed

## 2024-02-20 NOTE — HISTORY OF PRESENT ILLNESS
[FreeTextEntry1] : DM, HTN, HLD [de-identified] : -PMH: T2DM w/ Neuropathy, Hepatic Steatosis w/ Hepatomegaly, GB Polyp, HTN, HLD, GERD, Asthma, H/o TIA (Small Lacunar Infarct), Depression -SH: . 1 children. Employed. Current smoker. Occasional EtOH use.   ROXI is a 58 year F whom is here today for DM, HTN, HLD, Halitosis today reports resolution of halitosis   -T2DM: Remains on Ozempic 0.5mg & Metformin ER 500mg QD. On ASA & Statin. Compliant w/ BG monitoring. (11/2023) A1c 6.6. (5/2023) Optho Eval: No Retinopathy. No reported changes.  -HTN: Remains on Losartan 50mg QD, Labetalol 100mg BID & Amlodipine 5mg QD. Follows w/ Cardio. No reported changes -HLD & HyperTG: [place] Atorvastatin 80mg HS & Ezetimibe 10mg QD. No reported changes.  -GERD: Remains on Pantoprazole 40mg. Last EGD ~2yrs ago. No reported changes -Asthma: Remains on Montelukast 10mh QD & PRN Albuterol Inh. No reported changes  -Depression, Current Smoker: Smoking 1PPD. On Bupropion 150mg SR QD as per Cardio.

## 2024-02-21 DIAGNOSIS — R74.8 ABNORMAL LEVELS OF OTHER SERUM ENZYMES: ICD-10-CM

## 2024-02-21 DIAGNOSIS — D64.9 ANEMIA, UNSPECIFIED: ICD-10-CM

## 2024-02-21 LAB
ALBUMIN SERPL ELPH-MCNC: 4.4 G/DL
ALP BLD-CCNC: 113 U/L
ALT SERPL-CCNC: 24 U/L
ANION GAP SERPL CALC-SCNC: 13 MMOL/L
AST SERPL-CCNC: 28 U/L
BASOPHILS # BLD AUTO: 0.03 K/UL
BASOPHILS NFR BLD AUTO: 0.5 %
BILIRUB SERPL-MCNC: 0.4 MG/DL
BUN SERPL-MCNC: 10 MG/DL
CALCIUM SERPL-MCNC: 9.8 MG/DL
CHLORIDE SERPL-SCNC: 104 MMOL/L
CHOLEST SERPL-MCNC: 160 MG/DL
CO2 SERPL-SCNC: 22 MMOL/L
CREAT SERPL-MCNC: 0.76 MG/DL
CREAT SPEC-SCNC: 71 MG/DL
EGFR: 92 ML/MIN/1.73M2
EOSINOPHIL # BLD AUTO: 0.21 K/UL
EOSINOPHIL NFR BLD AUTO: 3.8 %
ESTIMATED AVERAGE GLUCOSE: 123 MG/DL
FERRITIN SERPL-MCNC: 42 NG/ML
GLUCOSE SERPL-MCNC: 127 MG/DL
HBA1C MFR BLD HPLC: 5.9 %
HCT VFR BLD CALC: 38.9 %
HDLC SERPL-MCNC: 34 MG/DL
HGB BLD-MCNC: 12.4 G/DL
IMM GRANULOCYTES NFR BLD AUTO: 0.2 %
IRON SATN MFR SERPL: 15 %
IRON SERPL-MCNC: 58 UG/DL
LDLC SERPL CALC-MCNC: 101 MG/DL
LYMPHOCYTES # BLD AUTO: 2.03 K/UL
LYMPHOCYTES NFR BLD AUTO: 36.5 %
MAN DIFF?: NORMAL
MCHC RBC-ENTMCNC: 28.2 PG
MCHC RBC-ENTMCNC: 31.9 GM/DL
MCV RBC AUTO: 88.4 FL
MICROALBUMIN 24H UR DL<=1MG/L-MCNC: <1.2 MG/DL
MICROALBUMIN/CREAT 24H UR-RTO: NORMAL MG/G
MONOCYTES # BLD AUTO: 0.37 K/UL
MONOCYTES NFR BLD AUTO: 6.7 %
NEUTROPHILS # BLD AUTO: 2.91 K/UL
NEUTROPHILS NFR BLD AUTO: 52.3 %
NONHDLC SERPL-MCNC: 126 MG/DL
PLATELET # BLD AUTO: 301 K/UL
POTASSIUM SERPL-SCNC: 4.3 MMOL/L
PROT SERPL-MCNC: 7.4 G/DL
RBC # BLD: 4.4 M/UL
RBC # FLD: 14.1 %
SODIUM SERPL-SCNC: 139 MMOL/L
TIBC SERPL-MCNC: 381 UG/DL
TRIGL SERPL-MCNC: 140 MG/DL
UIBC SERPL-MCNC: 323 UG/DL
VIT B12 SERPL-MCNC: 499 PG/ML
WBC # FLD AUTO: 5.56 K/UL

## 2024-03-20 ENCOUNTER — APPOINTMENT (OUTPATIENT)
Dept: GASTROENTEROLOGY | Facility: GI CENTER | Age: 59
End: 2024-03-20

## 2024-03-27 ENCOUNTER — APPOINTMENT (OUTPATIENT)
Dept: NEUROLOGY | Facility: CLINIC | Age: 59
End: 2024-03-27
Payer: COMMERCIAL

## 2024-03-27 DIAGNOSIS — I63.9 CEREBRAL INFARCTION, UNSPECIFIED: ICD-10-CM

## 2024-03-27 DIAGNOSIS — G56.03 CARPAL TUNNEL SYNDROM,BILATERAL UPPER LIMBS: ICD-10-CM

## 2024-03-27 DIAGNOSIS — M54.16 RADICULOPATHY, LUMBAR REGION: ICD-10-CM

## 2024-03-27 PROCEDURE — 99213 OFFICE O/P EST LOW 20 MIN: CPT

## 2024-03-27 PROCEDURE — G2211 COMPLEX E/M VISIT ADD ON: CPT

## 2024-03-27 NOTE — HISTORY OF PRESENT ILLNESS
[FreeTextEntry1] : I saw this patient in the office today.  As you recall, she described a long history of lower back pain and sciatica on the right side.  This has been going on for many years. It had become worse and began to radiate down the right leg into the foot. It is quite severe. It is associated with some numbness and tingling in the right leg. She reports that occasionally she will notice improvement if she changes position. She reports that she went for pain management injection and has had good relief. She states her pain management specialist no longer takes her insurance. I will give her a referral for the local pain management specialist that we refer to.  She also has had symptoms of carpal tunnel syndrome in both hands for the past several years. This had been somewhat worse, and she has seen an orthopedic specialist for it. She was found to have neuropathy on nerve conduction study.  In early January of 2019 she passed out briefly. She has had no further episodes. She was sent for an MRI of the brain. She was noted to have a small lacunar infarct that appeared chronic.  3/27/2024 visit:

## 2024-03-27 NOTE — DATA REVIEWED
[de-identified] : Brain MRI was performed on 2/13/19 at [facility].\par  The study demonstrated a chronic lacunar infarct in the left superior frontal region.\par  There is also some chronic ischemic change. [de-identified] : Nerve conduction studies demonstrated some peripheral neuropathy likely secondary to her diabetes.\par  \par  MRI of the lumbar spine was performed at [facility] on 2/5/18. \par  The study demonstrated multilevel degenerative changes and disc bulges.\par

## 2024-05-20 RX ORDER — SEMAGLUTIDE 0.68 MG/ML
2 INJECTION, SOLUTION SUBCUTANEOUS
Qty: 2 | Refills: 0 | Status: ACTIVE | COMMUNITY
Start: 2023-08-30 | End: 1900-01-01

## 2024-05-22 ENCOUNTER — RX RENEWAL (OUTPATIENT)
Age: 59
End: 2024-05-22

## 2024-05-22 RX ORDER — LOSARTAN POTASSIUM 50 MG/1
50 TABLET, FILM COATED ORAL DAILY
Qty: 90 | Refills: 0 | Status: ACTIVE | COMMUNITY
Start: 2022-03-03 | End: 1900-01-01

## 2024-06-12 RX ORDER — LABETALOL HYDROCHLORIDE 100 MG/1
100 TABLET, FILM COATED ORAL
Qty: 180 | Refills: 1 | Status: ACTIVE | COMMUNITY
Start: 1900-01-01 | End: 1900-01-01

## 2024-06-13 ENCOUNTER — APPOINTMENT (OUTPATIENT)
Dept: INTERNAL MEDICINE | Facility: CLINIC | Age: 59
End: 2024-06-13

## 2024-06-19 RX ORDER — ATORVASTATIN CALCIUM 80 MG/1
80 TABLET, FILM COATED ORAL
Qty: 90 | Refills: 3 | Status: ACTIVE | COMMUNITY
Start: 1900-01-01 | End: 1900-01-01

## 2024-06-19 RX ORDER — PANTOPRAZOLE 40 MG/1
40 TABLET, DELAYED RELEASE ORAL DAILY
Qty: 90 | Refills: 0 | Status: ACTIVE | COMMUNITY
Start: 2021-01-04 | End: 1900-01-01

## 2024-06-19 RX ORDER — AMLODIPINE BESYLATE 5 MG/1
5 TABLET ORAL DAILY
Qty: 90 | Refills: 0 | Status: ACTIVE | COMMUNITY
Start: 1900-01-01 | End: 1900-01-01

## 2024-06-19 RX ORDER — MONTELUKAST 10 MG/1
10 TABLET, FILM COATED ORAL
Qty: 90 | Refills: 3 | Status: ACTIVE | COMMUNITY
Start: 1900-01-01 | End: 1900-01-01

## 2024-07-02 ENCOUNTER — APPOINTMENT (OUTPATIENT)
Dept: INTERNAL MEDICINE | Facility: CLINIC | Age: 59
End: 2024-07-02
Payer: COMMERCIAL

## 2024-07-02 VITALS
HEIGHT: 65.5 IN | SYSTOLIC BLOOD PRESSURE: 130 MMHG | BODY MASS INDEX: 33.91 KG/M2 | OXYGEN SATURATION: 98 % | WEIGHT: 206 LBS | HEART RATE: 95 BPM | TEMPERATURE: 97.3 F | RESPIRATION RATE: 15 BRPM | DIASTOLIC BLOOD PRESSURE: 58 MMHG

## 2024-07-02 DIAGNOSIS — I10 ESSENTIAL (PRIMARY) HYPERTENSION: ICD-10-CM

## 2024-07-02 DIAGNOSIS — E11.9 TYPE 2 DIABETES MELLITUS W/OUT COMPLICATIONS: ICD-10-CM

## 2024-07-02 DIAGNOSIS — F32.A DEPRESSION, UNSPECIFIED: ICD-10-CM

## 2024-07-02 DIAGNOSIS — K82.4 CHOLESTEROLOSIS OF GALLBLADDER: ICD-10-CM

## 2024-07-02 DIAGNOSIS — E11.69 TYPE 2 DIABETES MELLITUS WITH OTHER SPECIFIED COMPLICATION: ICD-10-CM

## 2024-07-02 DIAGNOSIS — R19.6 HALITOSIS: ICD-10-CM

## 2024-07-02 DIAGNOSIS — E78.5 TYPE 2 DIABETES MELLITUS WITH OTHER SPECIFIED COMPLICATION: ICD-10-CM

## 2024-07-02 PROCEDURE — 99214 OFFICE O/P EST MOD 30 MIN: CPT

## 2024-07-02 PROCEDURE — G2211 COMPLEX E/M VISIT ADD ON: CPT

## 2024-07-10 LAB
ALBUMIN SERPL ELPH-MCNC: 4.5 G/DL
ALP BLD-CCNC: 111 U/L
ALT SERPL-CCNC: 36 U/L
ANION GAP SERPL CALC-SCNC: 17 MMOL/L
AST SERPL-CCNC: 49 U/L
BILIRUB SERPL-MCNC: 0.4 MG/DL
BUN SERPL-MCNC: 11 MG/DL
CALCIUM SERPL-MCNC: 9.5 MG/DL
CHLORIDE SERPL-SCNC: 103 MMOL/L
CHOLEST SERPL-MCNC: 169 MG/DL
CO2 SERPL-SCNC: 23 MMOL/L
CREAT SERPL-MCNC: 0.79 MG/DL
EGFR: 88 ML/MIN/1.73M2
ESTIMATED AVERAGE GLUCOSE: 117 MG/DL
GLUCOSE SERPL-MCNC: 113 MG/DL
HBA1C MFR BLD HPLC: 5.7 %
HDLC SERPL-MCNC: 32 MG/DL
LDLC SERPL CALC-MCNC: 103 MG/DL
NONHDLC SERPL-MCNC: 137 MG/DL
POTASSIUM SERPL-SCNC: 4.4 MMOL/L
PROT SERPL-MCNC: 7.6 G/DL
SODIUM SERPL-SCNC: 143 MMOL/L
TRIGL SERPL-MCNC: 195 MG/DL

## 2024-07-14 NOTE — HEALTH RISK ASSESSMENT
During skin assessment patient sitting on edge of the bed, RN asked to look under patient's breasts. Patient leaned back and knocked head on bedside table. Patient c/o soreness while palpating back of head. Tylenol given. MD notified. STAT head CT ordered.    [Very Good] : ~his/her~  mood as very good [20 or more] : 20 or more [Yes] : Yes [Monthly or less (1 pt)] : Monthly or less (1 point) [1 or 2 (0 pts)] : 1 or 2 (0 points) [Never (0 pts)] : Never (0 points) [No] : In the past 12 months have you used drugs other than those required for medical reasons? No [Other reason not done] : Other reason not done [I have developed a follow-up plan documented below in the note.] : I have developed a follow-up plan documented below in the note. [Patient reported mammogram was normal] : Patient reported mammogram was normal [Patient reported PAP Smear was normal] : Patient reported PAP Smear was normal [Patient reported bone density results were normal] : Patient reported bone density results were normal [Patient reported colonoscopy was abnormal] : Patient reported colonoscopy was abnormal [HIV test declined] : HIV test declined [Hepatitis C test declined] : Hepatitis C test declined [None] : None [Alone] : lives alone [Employed] : employed [Single] : single [] :  [# Of Children ___] : has [unfilled] children [Reviewed no changes] : Reviewed, no changes [Current] : Current [Audit-CScore] : 1 [de-identified] : Being managed on Bupropion  [Patient declined Low Dose CT Scan] : Patient declined Low Dose CT Scan [No Retinopathy] : No retinopathy [LowDoseCTScan] : 03/22 [EyeExamDate] : 12/21 [Change in mental status noted] : No change in mental status noted [Sexually Active] : not sexually active [Reports changes in hearing] : Reports no changes in hearing [Reports changes in vision] : Reports no changes in vision [MammogramDate] : 07/21 [BoneDensityDate] : 07/21 [PapSmearDate] : 03/21 [ColonoscopyDate] : 08/19 [ColonoscopyComments] : repeat 5yr [AdvancecareDate] : 12/21

## 2024-09-19 ENCOUNTER — OUTPATIENT (OUTPATIENT)
Dept: OUTPATIENT SERVICES | Facility: HOSPITAL | Age: 59
LOS: 1 days | End: 2024-09-19
Payer: COMMERCIAL

## 2024-09-19 ENCOUNTER — APPOINTMENT (OUTPATIENT)
Dept: INTERNAL MEDICINE | Facility: CLINIC | Age: 59
End: 2024-09-19
Payer: COMMERCIAL

## 2024-09-19 VITALS
BODY MASS INDEX: 35.56 KG/M2 | RESPIRATION RATE: 15 BRPM | OXYGEN SATURATION: 98 % | TEMPERATURE: 97.5 F | HEART RATE: 96 BPM | DIASTOLIC BLOOD PRESSURE: 80 MMHG | WEIGHT: 216 LBS | SYSTOLIC BLOOD PRESSURE: 138 MMHG | HEIGHT: 65.5 IN

## 2024-09-19 DIAGNOSIS — M79.672 PAIN IN LEFT FOOT: ICD-10-CM

## 2024-09-19 PROCEDURE — 99213 OFFICE O/P EST LOW 20 MIN: CPT

## 2024-09-19 PROCEDURE — 73620 X-RAY EXAM OF FOOT: CPT | Mod: 26,LT

## 2024-09-19 NOTE — ASSESSMENT
[FreeTextEntry1] : Unclear cause Xray left foot. r/o fracture Tylenol ES 2 tabs Q6hr PRN Pain Podiatry consult  Further recs pending imaging results avoid walking bear foot supportive shoes avoid prolonged standing  -3

## 2024-09-19 NOTE — PHYSICAL EXAM
[Normal] : no joint swelling and grossly normal strength and tone [de-identified] : left dorsal metatarsal swelling with tenderness to palpation between the 2nd & 3rd digits

## 2024-09-19 NOTE — HISTORY OF PRESENT ILLNESS
[FreeTextEntry8] : 58yo w/ pmhx controlled DM c/o left foot pain over the top of her toes suddenly began 1 week ago after being on her feet and wearing sandals 3/10 severity improved since onset Not taking anything OTC never had anything like this before worse with walking and weight b earing

## 2024-10-01 ENCOUNTER — APPOINTMENT (OUTPATIENT)
Dept: GASTROENTEROLOGY | Facility: CLINIC | Age: 59
End: 2024-10-01
Payer: COMMERCIAL

## 2024-10-01 VITALS
SYSTOLIC BLOOD PRESSURE: 165 MMHG | HEART RATE: 86 BPM | HEIGHT: 65.5 IN | WEIGHT: 216 LBS | OXYGEN SATURATION: 97 % | RESPIRATION RATE: 14 BRPM | TEMPERATURE: 97.3 F | DIASTOLIC BLOOD PRESSURE: 93 MMHG | BODY MASS INDEX: 35.56 KG/M2

## 2024-10-01 DIAGNOSIS — K63.5 POLYP OF COLON: ICD-10-CM

## 2024-10-01 PROBLEM — Z12.11 COLON CANCER SCREENING: Status: ACTIVE | Noted: 2024-10-01

## 2024-10-01 PROCEDURE — 99213 OFFICE O/P EST LOW 20 MIN: CPT

## 2024-10-01 RX ORDER — SODIUM SULFATE, POTASSIUM SULFATE AND MAGNESIUM SULFATE 1.6; 3.13; 17.5 G/177ML; G/177ML; G/177ML
17.5-3.13-1.6 SOLUTION ORAL
Qty: 1 | Refills: 0 | Status: ACTIVE | COMMUNITY
Start: 2024-10-01 | End: 1900-01-01

## 2024-10-01 NOTE — PHYSICAL EXAM
[Alert] : alert [Normal Voice/Communication] : normal voice/communication [No Acute Distress] : no acute distress [Sclera] : the sclera and conjunctiva were normal [Hearing Threshold Finger Rub Not [place]] : hearing was normal [Normal Lips/Gums] : the lips and gums were normal [Oropharynx] : the oropharynx was normal [Normal Appearance] : the appearance of the neck was normal [No CVA Tenderness] : no CVA  tenderness [No Spinal Tenderness] : no spinal tenderness [Abnormal Walk] : normal gait [Normal Color / Pigmentation] : normal skin color and pigmentation [No Focal Deficits] : no focal deficits [Oriented To Time, Place, And Person] : oriented to person, place, and time [No Respiratory Distress] : no respiratory distress [No Acc Muscle Use] : no accessory muscle use [Respiration, Rhythm And Depth] : normal respiratory rhythm and effort [Auscultation Breath Sounds / Voice Sounds] : lungs were clear to auscultation bilaterally [Heart Rate And Rhythm] : heart rate was normal and rhythm regular [None] : no edema [Bowel Sounds] : normal bowel sounds [Abdomen Tenderness] : non-tender [No Masses] : no abdominal mass palpated [Abdomen Soft] : soft

## 2024-10-01 NOTE — ASSESSMENT
[FreeTextEntry1] : 59-year-old female with a history of diabetes on Ozempic, hepatic steatosis, active tobacco use who presents for colon cancer screening. Colon cancer screening History of polyps Adopted Plan for colonoscopy Procedure instructions reviewed with patient Bowel prep sent to pharmacy

## 2024-10-03 ENCOUNTER — APPOINTMENT (OUTPATIENT)
Dept: INTERNAL MEDICINE | Facility: CLINIC | Age: 59
End: 2024-10-03
Payer: COMMERCIAL

## 2024-10-03 VITALS
BODY MASS INDEX: 35.56 KG/M2 | WEIGHT: 216 LBS | OXYGEN SATURATION: 98 % | DIASTOLIC BLOOD PRESSURE: 80 MMHG | RESPIRATION RATE: 14 BRPM | HEIGHT: 65.5 IN | HEART RATE: 86 BPM | SYSTOLIC BLOOD PRESSURE: 140 MMHG | TEMPERATURE: 97.3 F

## 2024-10-03 DIAGNOSIS — I10 ESSENTIAL (PRIMARY) HYPERTENSION: ICD-10-CM

## 2024-10-03 DIAGNOSIS — K82.4 CHOLESTEROLOSIS OF GALLBLADDER: ICD-10-CM

## 2024-10-03 DIAGNOSIS — E11.69 TYPE 2 DIABETES MELLITUS WITH OTHER SPECIFIED COMPLICATION: ICD-10-CM

## 2024-10-03 DIAGNOSIS — E78.5 TYPE 2 DIABETES MELLITUS WITH OTHER SPECIFIED COMPLICATION: ICD-10-CM

## 2024-10-03 DIAGNOSIS — E11.9 TYPE 2 DIABETES MELLITUS W/OUT COMPLICATIONS: ICD-10-CM

## 2024-10-03 PROCEDURE — G2211 COMPLEX E/M VISIT ADD ON: CPT

## 2024-10-03 PROCEDURE — 99214 OFFICE O/P EST MOD 30 MIN: CPT

## 2024-10-03 RX ORDER — ROSUVASTATIN CALCIUM 40 MG/1
40 TABLET, FILM COATED ORAL
Qty: 90 | Refills: 1 | Status: ACTIVE | COMMUNITY
Start: 2024-10-03 | End: 1900-01-01

## 2024-10-03 NOTE — HISTORY OF PRESENT ILLNESS
[FreeTextEntry1] : DM, HTN, HLD [de-identified] : -PMH: T2DM w/ Neuropathy, Hepatic Steatosis w/ Hepatomegaly, GB Polyp, HTN, HLD, GERD, Asthma, H/o TIA (Small Lacunar Infarct), Depression -SH: . 1 children. Employed. Current smoker. Occasional EtOH use.   ROXI is a 59 year F whom is here today for DM, HTN, HLD reports feeling well   -T2DM: Remains on Ozempic 0.5mg & Metformin ER 500mg QD. On ASA & Statin. Non-Compliant w/ BG monitoring. (7/2024) A1c 5.7. (5/2023) Optho Eval: No Retinopathy  -HTN: Remains on Losartan 50mg QD, Labetalol 100mg BID & Amlodipine 5mg QD. Follows w/ Cardio.  -HLD & HyperTG: Remains on Atorvastatin 80mg HS & Ezetimibe 10mg QD.  -GERD: Remains on Pantoprazole 40mg. Last EGD ~2yrs ago.  -Asthma: Remains on Montelukast 10mh QD & PRN Albuterol Inh.  -Depression, Current Smoker: Smoking 1PPD. On Bupropion 150mg SR QD as per Cardio.

## 2024-10-04 RX ORDER — TIRZEPATIDE 5 MG/.5ML
5 INJECTION, SOLUTION SUBCUTANEOUS
Qty: 3 | Refills: 0 | Status: ACTIVE | COMMUNITY
Start: 2024-10-03

## 2024-10-07 ENCOUNTER — APPOINTMENT (OUTPATIENT)
Dept: NEUROLOGY | Facility: CLINIC | Age: 59
End: 2024-10-07
Payer: COMMERCIAL

## 2024-10-07 VITALS
HEART RATE: 94 BPM | SYSTOLIC BLOOD PRESSURE: 150 MMHG | WEIGHT: 216 LBS | HEIGHT: 65 IN | DIASTOLIC BLOOD PRESSURE: 75 MMHG | BODY MASS INDEX: 35.99 KG/M2

## 2024-10-07 DIAGNOSIS — G56.03 CARPAL TUNNEL SYNDROM,BILATERAL UPPER LIMBS: ICD-10-CM

## 2024-10-07 DIAGNOSIS — M54.16 RADICULOPATHY, LUMBAR REGION: ICD-10-CM

## 2024-10-07 DIAGNOSIS — I63.9 CEREBRAL INFARCTION, UNSPECIFIED: ICD-10-CM

## 2024-10-07 PROCEDURE — 99213 OFFICE O/P EST LOW 20 MIN: CPT

## 2024-10-07 PROCEDURE — G2211 COMPLEX E/M VISIT ADD ON: CPT

## 2024-10-08 ENCOUNTER — APPOINTMENT (OUTPATIENT)
Dept: NEUROLOGY | Facility: CLINIC | Age: 59
End: 2024-10-08

## 2024-10-21 ENCOUNTER — APPOINTMENT (OUTPATIENT)
Dept: GASTROENTEROLOGY | Facility: CLINIC | Age: 59
End: 2024-10-21
Payer: COMMERCIAL

## 2024-10-21 VITALS
OXYGEN SATURATION: 98 % | HEART RATE: 97 BPM | DIASTOLIC BLOOD PRESSURE: 85 MMHG | WEIGHT: 216 LBS | RESPIRATION RATE: 14 BRPM | SYSTOLIC BLOOD PRESSURE: 159 MMHG | BODY MASS INDEX: 35.99 KG/M2 | HEIGHT: 65 IN

## 2024-10-21 DIAGNOSIS — K76.0 FATTY (CHANGE OF) LIVER, NOT ELSEWHERE CLASSIFIED: ICD-10-CM

## 2024-10-21 DIAGNOSIS — R16.0 HEPATOMEGALY, NOT ELSEWHERE CLASSIFIED: ICD-10-CM

## 2024-10-21 PROCEDURE — 99213 OFFICE O/P EST LOW 20 MIN: CPT

## 2024-10-21 RX ORDER — SEMAGLUTIDE 1.34 MG/ML
2 INJECTION, SOLUTION SUBCUTANEOUS
Refills: 0 | Status: ACTIVE | COMMUNITY

## 2024-11-06 ENCOUNTER — APPOINTMENT (OUTPATIENT)
Dept: INTERNAL MEDICINE | Facility: CLINIC | Age: 59
End: 2024-11-06
Payer: COMMERCIAL

## 2024-11-06 VITALS
HEIGHT: 65 IN | HEART RATE: 90 BPM | DIASTOLIC BLOOD PRESSURE: 68 MMHG | WEIGHT: 215 LBS | BODY MASS INDEX: 35.82 KG/M2 | RESPIRATION RATE: 14 BRPM | TEMPERATURE: 97 F | SYSTOLIC BLOOD PRESSURE: 121 MMHG | OXYGEN SATURATION: 97 %

## 2024-11-06 DIAGNOSIS — J01.90 ACUTE SINUSITIS, UNSPECIFIED: ICD-10-CM

## 2024-11-06 PROCEDURE — 99213 OFFICE O/P EST LOW 20 MIN: CPT

## 2024-11-11 ENCOUNTER — OUTPATIENT (OUTPATIENT)
Dept: OUTPATIENT SERVICES | Facility: HOSPITAL | Age: 59
LOS: 1 days | End: 2024-11-11
Payer: COMMERCIAL

## 2024-11-11 ENCOUNTER — RESULT REVIEW (OUTPATIENT)
Age: 59
End: 2024-11-11

## 2024-11-11 ENCOUNTER — APPOINTMENT (OUTPATIENT)
Dept: GASTROENTEROLOGY | Facility: GI CENTER | Age: 59
End: 2024-11-11
Payer: COMMERCIAL

## 2024-11-11 ENCOUNTER — TRANSCRIPTION ENCOUNTER (OUTPATIENT)
Age: 59
End: 2024-11-11

## 2024-11-11 DIAGNOSIS — K63.5 POLYP OF COLON: ICD-10-CM

## 2024-11-11 LAB — GLUCOSE BLDC GLUCOMTR-MCNC: 117 MG/DL — HIGH (ref 70–99)

## 2024-11-11 PROCEDURE — 88305 TISSUE EXAM BY PATHOLOGIST: CPT

## 2024-11-11 PROCEDURE — 45385 COLONOSCOPY W/LESION REMOVAL: CPT | Mod: PT

## 2024-11-11 PROCEDURE — 45380 COLONOSCOPY AND BIOPSY: CPT | Mod: PT,XS

## 2024-11-11 PROCEDURE — 82962 GLUCOSE BLOOD TEST: CPT

## 2024-11-11 PROCEDURE — 45380 COLONOSCOPY AND BIOPSY: CPT | Mod: 59

## 2024-11-11 PROCEDURE — 45385 COLONOSCOPY W/LESION REMOVAL: CPT

## 2024-11-11 PROCEDURE — 88305 TISSUE EXAM BY PATHOLOGIST: CPT | Mod: 26

## 2024-11-13 ENCOUNTER — EMERGENCY (EMERGENCY)
Facility: HOSPITAL | Age: 59
LOS: 1 days | Discharge: HOME | End: 2024-11-13
Attending: EMERGENCY MEDICINE
Payer: COMMERCIAL

## 2024-11-13 VITALS
DIASTOLIC BLOOD PRESSURE: 76 MMHG | RESPIRATION RATE: 20 BRPM | HEIGHT: 66 IN | WEIGHT: 211.64 LBS | SYSTOLIC BLOOD PRESSURE: 143 MMHG | TEMPERATURE: 98 F | OXYGEN SATURATION: 100 % | HEART RATE: 82 BPM

## 2024-11-13 VITALS
HEART RATE: 70 BPM | SYSTOLIC BLOOD PRESSURE: 120 MMHG | RESPIRATION RATE: 20 BRPM | OXYGEN SATURATION: 96 % | TEMPERATURE: 98 F | DIASTOLIC BLOOD PRESSURE: 77 MMHG

## 2024-11-13 LAB
ALBUMIN SERPL ELPH-MCNC: 4.1 G/DL — SIGNIFICANT CHANGE UP (ref 3.3–5.2)
ALP SERPL-CCNC: 91 U/L — SIGNIFICANT CHANGE UP (ref 40–120)
ALT FLD-CCNC: 31 U/L — SIGNIFICANT CHANGE UP
ANION GAP SERPL CALC-SCNC: 12 MMOL/L — SIGNIFICANT CHANGE UP (ref 5–17)
AST SERPL-CCNC: 39 U/L — HIGH
BASOPHILS # BLD AUTO: 0.04 K/UL — SIGNIFICANT CHANGE UP (ref 0–0.2)
BASOPHILS NFR BLD AUTO: 0.5 % — SIGNIFICANT CHANGE UP (ref 0–2)
BILIRUB SERPL-MCNC: 0.3 MG/DL — LOW (ref 0.4–2)
BUN SERPL-MCNC: 17.9 MG/DL — SIGNIFICANT CHANGE UP (ref 8–20)
CALCIUM SERPL-MCNC: 9.7 MG/DL — SIGNIFICANT CHANGE UP (ref 8.4–10.5)
CHLORIDE SERPL-SCNC: 103 MMOL/L — SIGNIFICANT CHANGE UP (ref 96–108)
CO2 SERPL-SCNC: 23 MMOL/L — SIGNIFICANT CHANGE UP (ref 22–29)
CREAT SERPL-MCNC: 0.91 MG/DL — SIGNIFICANT CHANGE UP (ref 0.5–1.3)
D DIMER BLD IA.RAPID-MCNC: <150 NG/ML DDU — SIGNIFICANT CHANGE UP
EGFR: 74 ML/MIN/1.73M2 — SIGNIFICANT CHANGE UP
EOSINOPHIL # BLD AUTO: 0.28 K/UL — SIGNIFICANT CHANGE UP (ref 0–0.5)
EOSINOPHIL NFR BLD AUTO: 3.2 % — SIGNIFICANT CHANGE UP (ref 0–6)
GLUCOSE SERPL-MCNC: 122 MG/DL — HIGH (ref 70–99)
HCG SERPL-ACNC: <4 MIU/ML — SIGNIFICANT CHANGE UP
HCT VFR BLD CALC: 37.6 % — SIGNIFICANT CHANGE UP (ref 34.5–45)
HGB BLD-MCNC: 12 G/DL — SIGNIFICANT CHANGE UP (ref 11.5–15.5)
IMM GRANULOCYTES NFR BLD AUTO: 0.2 % — SIGNIFICANT CHANGE UP (ref 0–0.9)
LYMPHOCYTES # BLD AUTO: 2.35 K/UL — SIGNIFICANT CHANGE UP (ref 1–3.3)
LYMPHOCYTES # BLD AUTO: 26.6 % — SIGNIFICANT CHANGE UP (ref 13–44)
MCHC RBC-ENTMCNC: 29.1 PG — SIGNIFICANT CHANGE UP (ref 27–34)
MCHC RBC-ENTMCNC: 31.9 G/DL — LOW (ref 32–36)
MCV RBC AUTO: 91 FL — SIGNIFICANT CHANGE UP (ref 80–100)
MONOCYTES # BLD AUTO: 0.59 K/UL — SIGNIFICANT CHANGE UP (ref 0–0.9)
MONOCYTES NFR BLD AUTO: 6.7 % — SIGNIFICANT CHANGE UP (ref 2–14)
NEUTROPHILS # BLD AUTO: 5.54 K/UL — SIGNIFICANT CHANGE UP (ref 1.8–7.4)
NEUTROPHILS NFR BLD AUTO: 62.8 % — SIGNIFICANT CHANGE UP (ref 43–77)
PLATELET # BLD AUTO: 300 K/UL — SIGNIFICANT CHANGE UP (ref 150–400)
POTASSIUM SERPL-MCNC: 4.4 MMOL/L — SIGNIFICANT CHANGE UP (ref 3.5–5.3)
POTASSIUM SERPL-SCNC: 4.4 MMOL/L — SIGNIFICANT CHANGE UP (ref 3.5–5.3)
PROT SERPL-MCNC: 7.4 G/DL — SIGNIFICANT CHANGE UP (ref 6.6–8.7)
RBC # BLD: 4.13 M/UL — SIGNIFICANT CHANGE UP (ref 3.8–5.2)
RBC # FLD: 13.7 % — SIGNIFICANT CHANGE UP (ref 10.3–14.5)
SODIUM SERPL-SCNC: 138 MMOL/L — SIGNIFICANT CHANGE UP (ref 135–145)
SURGICAL PATHOLOGY STUDY: SIGNIFICANT CHANGE UP
TROPONIN T, HIGH SENSITIVITY RESULT: <6 NG/L — SIGNIFICANT CHANGE UP (ref 0–51)
WBC # BLD: 8.82 K/UL — SIGNIFICANT CHANGE UP (ref 3.8–10.5)
WBC # FLD AUTO: 8.82 K/UL — SIGNIFICANT CHANGE UP (ref 3.8–10.5)

## 2024-11-13 PROCEDURE — 93010 ELECTROCARDIOGRAM REPORT: CPT

## 2024-11-13 PROCEDURE — 99285 EMERGENCY DEPT VISIT HI MDM: CPT

## 2024-11-13 RX ORDER — KETOROLAC TROMETHAMINE 30 MG/ML
30 INJECTION INTRAMUSCULAR; INTRAVENOUS ONCE
Refills: 0 | Status: DISCONTINUED | OUTPATIENT
Start: 2024-11-13 | End: 2024-11-13

## 2024-11-13 RX ORDER — LIDOCAINE HYDROCHLORIDE 40 MG/ML
1 SOLUTION TOPICAL ONCE
Refills: 0 | Status: COMPLETED | OUTPATIENT
Start: 2024-11-13 | End: 2024-11-13

## 2024-11-13 RX ORDER — SODIUM CHLORIDE 9 MG/ML
1000 INJECTION, SOLUTION INTRAMUSCULAR; INTRAVENOUS; SUBCUTANEOUS ONCE
Refills: 0 | Status: COMPLETED | OUTPATIENT
Start: 2024-11-13 | End: 2024-11-13

## 2024-11-13 RX ORDER — METHOCARBAMOL 500 MG/1
1500 TABLET ORAL ONCE
Refills: 0 | Status: COMPLETED | OUTPATIENT
Start: 2024-11-13 | End: 2024-11-13

## 2024-11-13 RX ADMIN — SODIUM CHLORIDE 1000 MILLILITER(S): 9 INJECTION, SOLUTION INTRAMUSCULAR; INTRAVENOUS; SUBCUTANEOUS at 23:06

## 2024-11-13 RX ADMIN — KETOROLAC TROMETHAMINE 30 MILLIGRAM(S): 30 INJECTION INTRAMUSCULAR; INTRAVENOUS at 23:40

## 2024-11-13 RX ADMIN — KETOROLAC TROMETHAMINE 30 MILLIGRAM(S): 30 INJECTION INTRAMUSCULAR; INTRAVENOUS at 23:06

## 2024-11-13 RX ADMIN — LIDOCAINE HYDROCHLORIDE 1 PATCH: 40 SOLUTION TOPICAL at 23:07

## 2024-11-13 RX ADMIN — METHOCARBAMOL 1500 MILLIGRAM(S): 500 TABLET ORAL at 23:06

## 2024-11-13 NOTE — ED ADULT TRIAGE NOTE - CHIEF COMPLAINT QUOTE
PT reports to ED with complaints of right sided upper back pain, worsening on exertion.
cerumen noted, b/l esotropia

## 2024-11-13 NOTE — ED PROVIDER NOTE - ATTENDING APP SHARED VISIT CONTRIBUTION OF CARE
60 y/o female with DM, HLD, HTN, asthma, gallstones, TIA pmhx presents to the ED for right sided upper back pain x 4 days. Pain radiates to the right upper abd. She does not recall trauma. Hurts when she coughs. CT, labs, meds    Impression: Back Pain    I, Eddie Martin, performed the initial face to face bedside interview with this patient regarding history of present illness, review of symptoms and relevant past medical, social and family history.  I completed an independent physical examination.  I was the initial provider who evaluated this patient. I have signed out the follow up of any pending tests (i.e. labs, radiological studies) to the ACP.  I have communicated the patient’s plan of care and disposition with the ACP.

## 2024-11-13 NOTE — ED PROVIDER NOTE - CLINICAL SUMMARY MEDICAL DECISION MAKING FREE TEXT BOX
58 y/o female with DM, HLD, HTN, asthma, gallstones, TIA pmhx presents to the ED for right sided upper back pain x 4 days. Pain radiates to the right upper abd. She does not recall trauma. Hurts when she coughs. CT, labs, meds  -Labs revealing no leukocytosis, stable H+H, CMP grossly unremarkable, hcg negative, trop wnl. UA negative  CT abd/chest revealing   Aortic calcifications. Coronary artery calcifications, enlarged liver, Prominent hepatic artery chain lymph nodes, Cholelithiasis, Small left adrenal myelolipoma measuring up to 1.9 cm, No bowel obstruction. Appendix is normal. Moderate constipation. A few colonic diverticula, without acute diverticulitis, Atherosclerotic changes. few mildly prominent retroperitoneal lymph measuring up to 1.1 cm. No evidence of pulmonary embolism, Moderate constipation.  Patient reassessed> Pain improved. meds sent. Possibly MSK in origin. RUQ US offered and declined   Pt re-assessed at this time, feeling well, noting improvement in symptoms. VSS, tolerating PO intake, ambulating in ED with steady gait. All results reviewed with pt, all questions answered. Pt provided copy of all results. Return precautions given. Stable for dc. case discussed with Attending

## 2024-11-13 NOTE — ED ADULT NURSE NOTE - OBJECTIVE STATEMENT
Pt A&OX4. Came in w/ c/o R medial back pain radiating to RUQ. Denies N/V/D, fevers, chills, body aches, numbness, weakness, tingling, chesrt pain, palpitations, SOB. VS stable RR even and unlabored, safety maintained.

## 2024-11-13 NOTE — ED PROVIDER NOTE - PATIENT PORTAL LINK FT
You can access the FollowMyHealth Patient Portal offered by [facility] by registering at the following website: [email or web address]. By joining [employer]’s FollowMyHealth portal, you will also be able to view your health information using other applications (apps) compatible with our system.

## 2024-11-13 NOTE — ED PROVIDER NOTE - PHYSICAL EXAMINATION
Gen: No acute distress, non toxic female, speaking in full sentences   HENT: NCAT, Mucous membranes moist, Oropharynx without exudates, uvula midline  Eyes: pink conjunctivae, EOMI, PERRL  CV: RRR, nl s1/s2 noted, chest wall non-tender   Resp: CTAB, normal rate and effort  GI: Abdomen soft, NT, ND. No rebound, no guarding  : No CVAT  Neuro: A&O x 3, sensorimotor intact without deficits   MSK: (+) Tender to Right sided thoracic paraspinals radiating into the Right sided upper abd. No cervical/thoracic/lumbar midline tenderness spinal to palpation, Full ROM ext x 4  Skin: No rashes. intact and perfused  Ambulatory in the ED

## 2024-11-13 NOTE — ED PROVIDER NOTE - OBJECTIVE STATEMENT
60 y/o female with DM, HLD, HTN, asthma, gallstones, TIA pmhx presents to the ED for right sided upper back pain x 4 days. Pain radiates to the right upper abd. She does not recall trauma. Hurts when she coughs. She had a colposcopy Monday (pain began prior to colposcopy).  No dysuria, no hematuria, no constipation /diarrhea, no fever/chills, no abd pain. No lower back pain, no headache, no numbness/tingling in the hands or feet, no vomiting. Active smoker, no alochol/drug use. She took 2 motrin. Allergic to PCN- hives. [facility] pharmacy.

## 2024-11-13 NOTE — ED PROVIDER NOTE - NSFOLLOWUPINSTRUCTIONS_ED_ALL_ED_FT
Please follow up with Spinal Doctor within 1 week   Please take medications as directed     Please follow up with Primary care provider within 3 days for incidental findings      CT abd/chest revealing   Aortic calcifications. Coronary artery calcifications, enlarged liver, Prominent hepatic artery chain lymph nodes, Cholelithiasis, Small left adrenal myelolipoma measuring up to 1.9 cm, No bowel obstruction. Appendix is normal. Moderate constipation. A few colonic diverticula, without acute diverticulitis, Atherosclerotic changes. few mildly prominent retroperitoneal lymph measuring up to 1.1 cm. No evidence of pulmonary embolism, Moderate constipation.

## 2024-11-14 LAB
APPEARANCE UR: CLEAR — SIGNIFICANT CHANGE UP
BACTERIA # UR AUTO: NEGATIVE /HPF — SIGNIFICANT CHANGE UP
BILIRUB UR-MCNC: NEGATIVE — SIGNIFICANT CHANGE UP
CAST: 0 /LPF — SIGNIFICANT CHANGE UP (ref 0–4)
COLOR SPEC: YELLOW — SIGNIFICANT CHANGE UP
DIFF PNL FLD: NEGATIVE — SIGNIFICANT CHANGE UP
GLUCOSE UR QL: NEGATIVE MG/DL — SIGNIFICANT CHANGE UP
KETONES UR-MCNC: NEGATIVE MG/DL — SIGNIFICANT CHANGE UP
LEUKOCYTE ESTERASE UR-ACNC: NEGATIVE — SIGNIFICANT CHANGE UP
NITRITE UR-MCNC: NEGATIVE — SIGNIFICANT CHANGE UP
PH UR: 6 — SIGNIFICANT CHANGE UP (ref 5–8)
PROT UR-MCNC: NEGATIVE MG/DL — SIGNIFICANT CHANGE UP
RBC CASTS # UR COMP ASSIST: 1 /HPF — SIGNIFICANT CHANGE UP (ref 0–4)
SP GR SPEC: 1.02 — SIGNIFICANT CHANGE UP (ref 1–1.03)
SQUAMOUS # UR AUTO: 2 /HPF — SIGNIFICANT CHANGE UP (ref 0–5)
UROBILINOGEN FLD QL: 0.2 MG/DL — SIGNIFICANT CHANGE UP (ref 0.2–1)
WBC UR QL: 1 /HPF — SIGNIFICANT CHANGE UP (ref 0–5)

## 2024-11-14 PROCEDURE — 71275 CT ANGIOGRAPHY CHEST: CPT | Mod: MC

## 2024-11-14 PROCEDURE — 99285 EMERGENCY DEPT VISIT HI MDM: CPT | Mod: 25

## 2024-11-14 PROCEDURE — 96374 THER/PROPH/DIAG INJ IV PUSH: CPT | Mod: XU

## 2024-11-14 PROCEDURE — 81001 URINALYSIS AUTO W/SCOPE: CPT

## 2024-11-14 PROCEDURE — 85025 COMPLETE CBC W/AUTO DIFF WBC: CPT

## 2024-11-14 PROCEDURE — 84484 ASSAY OF TROPONIN QUANT: CPT

## 2024-11-14 PROCEDURE — 96361 HYDRATE IV INFUSION ADD-ON: CPT

## 2024-11-14 PROCEDURE — 93005 ELECTROCARDIOGRAM TRACING: CPT

## 2024-11-14 PROCEDURE — 83690 ASSAY OF LIPASE: CPT

## 2024-11-14 PROCEDURE — 87086 URINE CULTURE/COLONY COUNT: CPT

## 2024-11-14 PROCEDURE — 80053 COMPREHEN METABOLIC PANEL: CPT

## 2024-11-14 PROCEDURE — 74177 CT ABD & PELVIS W/CONTRAST: CPT | Mod: MC

## 2024-11-14 PROCEDURE — 36415 COLL VENOUS BLD VENIPUNCTURE: CPT

## 2024-11-14 PROCEDURE — 85379 FIBRIN DEGRADATION QUANT: CPT

## 2024-11-14 PROCEDURE — 74177 CT ABD & PELVIS W/CONTRAST: CPT | Mod: 26,MC

## 2024-11-14 PROCEDURE — 84702 CHORIONIC GONADOTROPIN TEST: CPT

## 2024-11-14 PROCEDURE — 71275 CT ANGIOGRAPHY CHEST: CPT | Mod: 26,MC

## 2024-11-14 RX ORDER — METHOCARBAMOL 500 MG/1
2 TABLET ORAL
Qty: 18 | Refills: 0
Start: 2024-11-14 | End: 2024-11-16

## 2024-11-14 RX ADMIN — SODIUM CHLORIDE 1000 MILLILITER(S): 9 INJECTION, SOLUTION INTRAMUSCULAR; INTRAVENOUS; SUBCUTANEOUS at 00:08

## 2024-11-15 LAB
CULTURE RESULTS: SIGNIFICANT CHANGE UP
SPECIMEN SOURCE: SIGNIFICANT CHANGE UP

## 2024-11-19 ENCOUNTER — APPOINTMENT (OUTPATIENT)
Dept: INTERNAL MEDICINE | Facility: CLINIC | Age: 59
End: 2024-11-19
Payer: COMMERCIAL

## 2024-11-19 ENCOUNTER — NON-APPOINTMENT (OUTPATIENT)
Age: 59
End: 2024-11-19

## 2024-11-19 VITALS
TEMPERATURE: 97.2 F | WEIGHT: 215 LBS | DIASTOLIC BLOOD PRESSURE: 64 MMHG | BODY MASS INDEX: 35.82 KG/M2 | RESPIRATION RATE: 14 BRPM | HEART RATE: 86 BPM | SYSTOLIC BLOOD PRESSURE: 110 MMHG | HEIGHT: 65 IN | OXYGEN SATURATION: 98 %

## 2024-11-19 DIAGNOSIS — Z87.09 PERSONAL HISTORY OF OTHER DISEASES OF THE RESPIRATORY SYSTEM: ICD-10-CM

## 2024-11-19 DIAGNOSIS — D17.79 BENIGN LIPOMATOUS NEOPLASM OF OTHER SITES: ICD-10-CM

## 2024-11-19 DIAGNOSIS — K76.0 FATTY (CHANGE OF) LIVER, NOT ELSEWHERE CLASSIFIED: ICD-10-CM

## 2024-11-19 DIAGNOSIS — R16.0 HEPATOMEGALY, NOT ELSEWHERE CLASSIFIED: ICD-10-CM

## 2024-11-19 DIAGNOSIS — R59.9 ENLARGED LYMPH NODES, UNSPECIFIED: ICD-10-CM

## 2024-11-19 DIAGNOSIS — K59.09 OTHER CONSTIPATION: ICD-10-CM

## 2024-11-19 DIAGNOSIS — M79.672 PAIN IN LEFT FOOT: ICD-10-CM

## 2024-11-19 PROCEDURE — 99215 OFFICE O/P EST HI 40 MIN: CPT

## 2024-11-19 RX ORDER — BENZONATATE 100 MG/1
100 CAPSULE ORAL EVERY 6 HOURS
Qty: 30 | Refills: 0 | Status: DISCONTINUED | COMMUNITY
Start: 2024-11-14 | End: 2024-11-19

## 2024-11-25 ENCOUNTER — APPOINTMENT (OUTPATIENT)
Dept: INTERNAL MEDICINE | Facility: CLINIC | Age: 59
End: 2024-11-25

## 2024-12-03 ENCOUNTER — APPOINTMENT (OUTPATIENT)
Age: 59
End: 2024-12-03

## 2025-01-29 ENCOUNTER — APPOINTMENT (OUTPATIENT)
Dept: INTERNAL MEDICINE | Facility: CLINIC | Age: 60
End: 2025-01-29
Payer: COMMERCIAL

## 2025-01-29 VITALS
WEIGHT: 213 LBS | SYSTOLIC BLOOD PRESSURE: 120 MMHG | DIASTOLIC BLOOD PRESSURE: 71 MMHG | RESPIRATION RATE: 14 BRPM | TEMPERATURE: 97.8 F | BODY MASS INDEX: 35.49 KG/M2 | OXYGEN SATURATION: 98 % | HEIGHT: 65 IN | HEART RATE: 93 BPM

## 2025-01-29 DIAGNOSIS — Z00.00 ENCOUNTER FOR GENERAL ADULT MEDICAL EXAMINATION W/OUT ABNORMAL FINDINGS: ICD-10-CM

## 2025-01-29 DIAGNOSIS — F17.210 NICOTINE DEPENDENCE, CIGARETTES, UNCOMPLICATED: ICD-10-CM

## 2025-01-29 DIAGNOSIS — E11.9 TYPE 2 DIABETES MELLITUS W/OUT COMPLICATIONS: ICD-10-CM

## 2025-01-29 DIAGNOSIS — Z86.73 PERSONAL HISTORY OF TRANSIENT ISCHEMIC ATTACK (TIA), AND CEREBRAL INFARCTION W/OUT RESIDUAL DEFICITS: ICD-10-CM

## 2025-01-29 DIAGNOSIS — I10 ESSENTIAL (PRIMARY) HYPERTENSION: ICD-10-CM

## 2025-01-29 DIAGNOSIS — J45.909 UNSPECIFIED ASTHMA, UNCOMPLICATED: ICD-10-CM

## 2025-01-29 DIAGNOSIS — R59.9 ENLARGED LYMPH NODES, UNSPECIFIED: ICD-10-CM

## 2025-01-29 DIAGNOSIS — K82.4 CHOLESTEROLOSIS OF GALLBLADDER: ICD-10-CM

## 2025-01-29 DIAGNOSIS — E11.69 TYPE 2 DIABETES MELLITUS WITH OTHER SPECIFIED COMPLICATION: ICD-10-CM

## 2025-01-29 DIAGNOSIS — E78.5 TYPE 2 DIABETES MELLITUS WITH OTHER SPECIFIED COMPLICATION: ICD-10-CM

## 2025-01-29 DIAGNOSIS — F32.A DEPRESSION, UNSPECIFIED: ICD-10-CM

## 2025-01-29 PROCEDURE — 99396 PREV VISIT EST AGE 40-64: CPT

## 2025-01-30 LAB
ALBUMIN SERPL ELPH-MCNC: 4.5 G/DL
ALP BLD-CCNC: 97 U/L
ALT SERPL-CCNC: 27 U/L
ANION GAP SERPL CALC-SCNC: 12 MMOL/L
AST SERPL-CCNC: 36 U/L
BASOPHILS # BLD AUTO: 0.03 K/UL
BASOPHILS NFR BLD AUTO: 0.4 %
BILIRUB SERPL-MCNC: 0.3 MG/DL
BUN SERPL-MCNC: 15 MG/DL
CALCIUM SERPL-MCNC: 10 MG/DL
CHLORIDE SERPL-SCNC: 104 MMOL/L
CHOLEST SERPL-MCNC: 144 MG/DL
CO2 SERPL-SCNC: 25 MMOL/L
CREAT SERPL-MCNC: 0.84 MG/DL
CREAT SPEC-SCNC: 43 MG/DL
EGFR: 81 ML/MIN/1.73M2
EOSINOPHIL # BLD AUTO: 0.23 K/UL
EOSINOPHIL NFR BLD AUTO: 3.3 %
ESTIMATED AVERAGE GLUCOSE: 128 MG/DL
GLUCOSE SERPL-MCNC: 110 MG/DL
HBA1C MFR BLD HPLC: 6.1 %
HCT VFR BLD CALC: 40.3 %
HDLC SERPL-MCNC: 35 MG/DL
HGB BLD-MCNC: 12.4 G/DL
IMM GRANULOCYTES NFR BLD AUTO: 0.3 %
LDLC SERPL CALC-MCNC: 83 MG/DL
LYMPHOCYTES # BLD AUTO: 2.42 K/UL
LYMPHOCYTES NFR BLD AUTO: 35.2 %
MAN DIFF?: NORMAL
MCHC RBC-ENTMCNC: 29.5 PG
MCHC RBC-ENTMCNC: 30.8 G/DL
MCV RBC AUTO: 95.7 FL
MICROALBUMIN 24H UR DL<=1MG/L-MCNC: <1.2 MG/DL
MICROALBUMIN/CREAT 24H UR-RTO: NORMAL MG/G
MONOCYTES # BLD AUTO: 0.54 K/UL
MONOCYTES NFR BLD AUTO: 7.8 %
NEUTROPHILS # BLD AUTO: 3.64 K/UL
NEUTROPHILS NFR BLD AUTO: 53 %
NONHDLC SERPL-MCNC: 109 MG/DL
PLATELET # BLD AUTO: 342 K/UL
POTASSIUM SERPL-SCNC: 4.8 MMOL/L
PROT SERPL-MCNC: 7.3 G/DL
RBC # BLD: 4.21 M/UL
RBC # FLD: 13.7 %
SODIUM SERPL-SCNC: 141 MMOL/L
TRIGL SERPL-MCNC: 144 MG/DL
TSH SERPL-ACNC: 1.83 UIU/ML
WBC # FLD AUTO: 6.88 K/UL

## 2025-02-21 ENCOUNTER — APPOINTMENT (OUTPATIENT)
Dept: INTERNAL MEDICINE | Facility: CLINIC | Age: 60
End: 2025-02-21
Payer: COMMERCIAL

## 2025-02-21 VITALS
RESPIRATION RATE: 14 BRPM | BODY MASS INDEX: 35.82 KG/M2 | TEMPERATURE: 97.6 F | DIASTOLIC BLOOD PRESSURE: 72 MMHG | OXYGEN SATURATION: 98 % | WEIGHT: 215 LBS | HEIGHT: 65 IN | HEART RATE: 95 BPM | SYSTOLIC BLOOD PRESSURE: 130 MMHG

## 2025-02-21 DIAGNOSIS — M25.511 PAIN IN RIGHT SHOULDER: ICD-10-CM

## 2025-02-21 PROCEDURE — 99213 OFFICE O/P EST LOW 20 MIN: CPT

## 2025-03-27 DIAGNOSIS — Z20.818 CONTACT WITH AND (SUSPECTED) EXPOSURE TO OTHER BACTERIAL COMMUNICABLE DISEASES: ICD-10-CM

## 2025-03-27 DIAGNOSIS — F41.8 OTHER SPECIFIED ANXIETY DISORDERS: ICD-10-CM

## 2025-03-27 RX ORDER — ALPRAZOLAM 0.25 MG/1
0.25 TABLET ORAL
Qty: 5 | Refills: 0 | Status: ACTIVE | COMMUNITY
Start: 2025-03-27 | End: 1900-01-01

## 2025-03-27 RX ORDER — AZITHROMYCIN 250 MG/1
250 TABLET, FILM COATED ORAL
Qty: 1 | Refills: 0 | Status: ACTIVE | COMMUNITY
Start: 2025-03-27 | End: 1900-01-01

## 2025-04-07 ENCOUNTER — APPOINTMENT (OUTPATIENT)
Dept: NEUROLOGY | Facility: CLINIC | Age: 60
End: 2025-04-07
Payer: COMMERCIAL

## 2025-04-07 VITALS
BODY MASS INDEX: 35.82 KG/M2 | SYSTOLIC BLOOD PRESSURE: 111 MMHG | WEIGHT: 215 LBS | HEIGHT: 65 IN | DIASTOLIC BLOOD PRESSURE: 72 MMHG

## 2025-04-07 DIAGNOSIS — G56.03 CARPAL TUNNEL SYNDROM,BILATERAL UPPER LIMBS: ICD-10-CM

## 2025-04-07 DIAGNOSIS — M54.16 RADICULOPATHY, LUMBAR REGION: ICD-10-CM

## 2025-04-07 DIAGNOSIS — I63.9 CEREBRAL INFARCTION, UNSPECIFIED: ICD-10-CM

## 2025-04-07 PROCEDURE — 99213 OFFICE O/P EST LOW 20 MIN: CPT

## 2025-04-07 PROCEDURE — G2211 COMPLEX E/M VISIT ADD ON: CPT

## 2025-04-07 RX ORDER — FLUTICASONE PROPIONATE 50 UG/1
50 SPRAY, METERED NASAL
Qty: 16 | Refills: 0 | Status: ACTIVE | COMMUNITY
Start: 2024-11-18

## 2025-04-22 ENCOUNTER — APPOINTMENT (OUTPATIENT)
Dept: INTERNAL MEDICINE | Facility: CLINIC | Age: 60
End: 2025-04-22
Payer: COMMERCIAL

## 2025-04-22 VITALS
WEIGHT: 210 LBS | HEIGHT: 65 IN | HEART RATE: 94 BPM | OXYGEN SATURATION: 97 % | SYSTOLIC BLOOD PRESSURE: 120 MMHG | TEMPERATURE: 98 F | RESPIRATION RATE: 14 BRPM | DIASTOLIC BLOOD PRESSURE: 78 MMHG | BODY MASS INDEX: 34.99 KG/M2

## 2025-04-22 DIAGNOSIS — E11.9 TYPE 2 DIABETES MELLITUS W/OUT COMPLICATIONS: ICD-10-CM

## 2025-04-22 DIAGNOSIS — R59.9 ENLARGED LYMPH NODES, UNSPECIFIED: ICD-10-CM

## 2025-04-22 DIAGNOSIS — I10 ESSENTIAL (PRIMARY) HYPERTENSION: ICD-10-CM

## 2025-04-22 DIAGNOSIS — F17.210 NICOTINE DEPENDENCE, CIGARETTES, UNCOMPLICATED: ICD-10-CM

## 2025-04-22 DIAGNOSIS — E78.5 TYPE 2 DIABETES MELLITUS WITH OTHER SPECIFIED COMPLICATION: ICD-10-CM

## 2025-04-22 DIAGNOSIS — J45.909 UNSPECIFIED ASTHMA, UNCOMPLICATED: ICD-10-CM

## 2025-04-22 DIAGNOSIS — E11.69 TYPE 2 DIABETES MELLITUS WITH OTHER SPECIFIED COMPLICATION: ICD-10-CM

## 2025-04-22 DIAGNOSIS — Z20.818 CONTACT WITH AND (SUSPECTED) EXPOSURE TO OTHER BACTERIAL COMMUNICABLE DISEASES: ICD-10-CM

## 2025-04-22 PROCEDURE — G2211 COMPLEX E/M VISIT ADD ON: CPT

## 2025-04-22 PROCEDURE — 99214 OFFICE O/P EST MOD 30 MIN: CPT

## 2025-04-30 ENCOUNTER — OUTPATIENT (OUTPATIENT)
Dept: OUTPATIENT SERVICES | Facility: HOSPITAL | Age: 60
LOS: 1 days | Discharge: HOME | End: 2025-04-30

## 2025-04-30 ENCOUNTER — APPOINTMENT (OUTPATIENT)
Dept: ORTHOPEDIC SURGERY | Facility: CLINIC | Age: 60
End: 2025-04-30
Payer: COMMERCIAL

## 2025-04-30 DIAGNOSIS — M25.562 PAIN IN LEFT KNEE: ICD-10-CM

## 2025-04-30 DIAGNOSIS — M75.102 UNSPECIFIED ROTATOR CUFF TEAR OR RUPTURE OF LEFT SHOULDER, NOT SPECIFIED AS TRAUMATIC: ICD-10-CM

## 2025-04-30 DIAGNOSIS — M17.12 UNILATERAL PRIMARY OSTEOARTHRITIS, LEFT KNEE: ICD-10-CM

## 2025-04-30 DIAGNOSIS — M75.101 UNSPECIFIED ROTATOR CUFF TEAR OR RUPTURE OF RIGHT SHOULDER, NOT SPECIFIED AS TRAUMATIC: ICD-10-CM

## 2025-04-30 DIAGNOSIS — R59.9 ENLARGED LYMPH NODES, UNSPECIFIED: ICD-10-CM

## 2025-04-30 PROBLEM — R59.1 LAD (LYMPHADENOPATHY): Status: ACTIVE | Noted: 2025-04-30

## 2025-04-30 PROCEDURE — 73564 X-RAY EXAM KNEE 4 OR MORE: CPT | Mod: LT

## 2025-04-30 PROCEDURE — 20611 DRAIN/INJ JOINT/BURSA W/US: CPT | Mod: RT

## 2025-04-30 PROCEDURE — 99204 OFFICE O/P NEW MOD 45 MIN: CPT | Mod: 25

## 2025-05-07 ENCOUNTER — RESULT REVIEW (OUTPATIENT)
Age: 60
End: 2025-05-07

## 2025-05-07 ENCOUNTER — APPOINTMENT (OUTPATIENT)
Dept: HEMATOLOGY ONCOLOGY | Facility: CLINIC | Age: 60
End: 2025-05-07
Payer: COMMERCIAL

## 2025-05-07 ENCOUNTER — LABORATORY RESULT (OUTPATIENT)
Age: 60
End: 2025-05-07

## 2025-05-07 VITALS
OXYGEN SATURATION: 97 % | HEIGHT: 65 IN | DIASTOLIC BLOOD PRESSURE: 70 MMHG | SYSTOLIC BLOOD PRESSURE: 123 MMHG | WEIGHT: 208.55 LBS | TEMPERATURE: 97.5 F | BODY MASS INDEX: 34.75 KG/M2 | HEART RATE: 80 BPM

## 2025-05-07 DIAGNOSIS — E11.69 TYPE 2 DIABETES MELLITUS WITH OTHER SPECIFIED COMPLICATION: ICD-10-CM

## 2025-05-07 DIAGNOSIS — R16.0 HEPATOMEGALY, NOT ELSEWHERE CLASSIFIED: ICD-10-CM

## 2025-05-07 DIAGNOSIS — R59.1 GENERALIZED ENLARGED LYMPH NODES: ICD-10-CM

## 2025-05-07 DIAGNOSIS — K76.0 FATTY (CHANGE OF) LIVER, NOT ELSEWHERE CLASSIFIED: ICD-10-CM

## 2025-05-07 DIAGNOSIS — E78.5 TYPE 2 DIABETES MELLITUS WITH OTHER SPECIFIED COMPLICATION: ICD-10-CM

## 2025-05-07 LAB
ALBUMIN SERPL ELPH-MCNC: 4.2 G/DL — SIGNIFICANT CHANGE UP (ref 3.3–5)
ALP SERPL-CCNC: 74 U/L — SIGNIFICANT CHANGE UP (ref 40–120)
ALT FLD-CCNC: 24 U/L — SIGNIFICANT CHANGE UP (ref 10–40)
ANION GAP SERPL CALC-SCNC: 14 MMOL/L — SIGNIFICANT CHANGE UP (ref 5–17)
AST SERPL-CCNC: 31 U/L — SIGNIFICANT CHANGE UP (ref 10–35)
BASOPHILS # BLD AUTO: 0.04 K/UL — SIGNIFICANT CHANGE UP (ref 0–0.2)
BASOPHILS NFR BLD AUTO: 0.5 % — SIGNIFICANT CHANGE UP (ref 0–2)
BILIRUB INDIRECT SERPL-MCNC: 0.3 MG/DL
BILIRUB SERPL-MCNC: 0.4 MG/DL — SIGNIFICANT CHANGE UP (ref 0.2–1.2)
BUN SERPL-MCNC: 13 MG/DL — SIGNIFICANT CHANGE UP (ref 7–23)
CALCIUM SERPL-MCNC: 9.9 MG/DL — SIGNIFICANT CHANGE UP (ref 8.4–10.5)
CHLORIDE SERPL-SCNC: 105 MMOL/L — SIGNIFICANT CHANGE UP (ref 96–108)
CHOLEST SERPL-MCNC: 147 MG/DL
CO2 SERPL-SCNC: 21 MMOL/L — LOW (ref 22–31)
CREAT SERPL-MCNC: 0.81 MG/DL — SIGNIFICANT CHANGE UP (ref 0.5–1.3)
EGFR: 85 ML/MIN/1.73M2 — SIGNIFICANT CHANGE UP
EGFR: 85 ML/MIN/1.73M2 — SIGNIFICANT CHANGE UP
EOSINOPHIL # BLD AUTO: 0.26 K/UL — SIGNIFICANT CHANGE UP (ref 0–0.5)
EOSINOPHIL NFR BLD AUTO: 3.3 % — SIGNIFICANT CHANGE UP (ref 0–6)
GLUCOSE SERPL-MCNC: 87 MG/DL — SIGNIFICANT CHANGE UP (ref 70–99)
HCT VFR BLD CALC: 38.2 % — SIGNIFICANT CHANGE UP (ref 34.5–45)
HDLC SERPL-MCNC: 42 MG/DL
HGB BLD-MCNC: 12.2 G/DL — SIGNIFICANT CHANGE UP (ref 11.5–15.5)
IMM GRANULOCYTES # BLD AUTO: 0.01 K/UL — SIGNIFICANT CHANGE UP (ref 0–0.07)
IMM GRANULOCYTES NFR BLD AUTO: 0.1 % — SIGNIFICANT CHANGE UP (ref 0–0.9)
LDH SERPL L TO P-CCNC: 239 U/L — SIGNIFICANT CHANGE UP (ref 50–242)
LDLC SERPL-MCNC: 85 MG/DL
LYMPHOCYTES # BLD AUTO: 3.03 K/UL — SIGNIFICANT CHANGE UP (ref 1–3.3)
LYMPHOCYTES NFR BLD AUTO: 38.5 % — SIGNIFICANT CHANGE UP (ref 13–44)
MCHC RBC-ENTMCNC: 28.6 PG — SIGNIFICANT CHANGE UP (ref 27–34)
MCHC RBC-ENTMCNC: 31.9 G/DL — LOW (ref 32–36)
MCV RBC AUTO: 89.7 FL — SIGNIFICANT CHANGE UP (ref 80–100)
MONOCYTES # BLD AUTO: 0.46 K/UL — SIGNIFICANT CHANGE UP (ref 0–0.9)
MONOCYTES NFR BLD AUTO: 5.9 % — SIGNIFICANT CHANGE UP (ref 2–14)
NEUTROPHILS # BLD AUTO: 4.06 K/UL — SIGNIFICANT CHANGE UP (ref 1.8–7.4)
NEUTROPHILS NFR BLD AUTO: 51.7 % — SIGNIFICANT CHANGE UP (ref 43–77)
NONHDLC SERPL-MCNC: 106 MG/DL
NRBC # BLD AUTO: 0 K/UL — SIGNIFICANT CHANGE UP (ref 0–0)
NRBC # FLD: 0 K/UL — SIGNIFICANT CHANGE UP (ref 0–0)
NRBC BLD AUTO-RTO: 0 /100 WBCS — SIGNIFICANT CHANGE UP (ref 0–0)
PLATELET # BLD AUTO: 306 K/UL — SIGNIFICANT CHANGE UP (ref 150–400)
PMV BLD: 9.3 FL — SIGNIFICANT CHANGE UP (ref 7–13)
POTASSIUM SERPL-MCNC: 4.5 MMOL/L — SIGNIFICANT CHANGE UP (ref 3.5–5.3)
POTASSIUM SERPL-SCNC: 4.5 MMOL/L — SIGNIFICANT CHANGE UP (ref 3.5–5.3)
PROT SERPL-MCNC: 7.4 G/DL — SIGNIFICANT CHANGE UP (ref 6–8.3)
RBC # BLD: 4.26 M/UL — SIGNIFICANT CHANGE UP (ref 3.8–5.2)
RBC # FLD: 13.1 % — SIGNIFICANT CHANGE UP (ref 10.3–14.5)
SODIUM SERPL-SCNC: 140 MMOL/L — SIGNIFICANT CHANGE UP (ref 135–145)
TRIGL SERPL-MCNC: 115 MG/DL
URATE SERPL-MCNC: 2.5 MG/DL — SIGNIFICANT CHANGE UP (ref 2.5–7)
WBC # BLD: 7.86 K/UL — SIGNIFICANT CHANGE UP (ref 3.8–10.5)
WBC # FLD AUTO: 7.86 K/UL — SIGNIFICANT CHANGE UP (ref 3.8–10.5)

## 2025-05-07 PROCEDURE — 99203 OFFICE O/P NEW LOW 30 MIN: CPT

## 2025-05-07 PROCEDURE — 88189 FLOWCYTOMETRY/READ 16 & >: CPT | Mod: 59

## 2025-05-08 LAB — FLOW CYTOMETRY FINAL REPORT: SIGNIFICANT CHANGE UP

## 2025-05-09 PROBLEM — R79.1 PROLONGED PT (PROTHROMBIN TIME): Status: ACTIVE | Noted: 2025-05-09

## 2025-05-09 PROBLEM — R79.1 PROLONGED PTT: Status: ACTIVE | Noted: 2025-05-09

## 2025-05-13 LAB
ACE BLD-CCNC: 23 U/L
ALBUMIN MFR SERPL ELPH: 57.1 %
ALBUMIN SERPL-MCNC: 4 G/DL
ALBUMIN/GLOB SERPL: 1.3 RATIO
ALPHA1 GLOB MFR SERPL ELPH: 4.3 %
ALPHA1 GLOB SERPL ELPH-MCNC: 0.3 G/DL
ALPHA2 GLOB MFR SERPL ELPH: 10.7 %
ALPHA2 GLOB SERPL ELPH-MCNC: 0.7 G/DL
APTT BLD: 61.5 SEC
B-GLOBULIN MFR SERPL ELPH: 14.3 %
B-GLOBULIN SERPL ELPH-MCNC: 1 G/DL
DEPRECATED KAPPA LC FREE/LAMBDA SER: 1.38 RATIO
ESTIMATED AVERAGE GLUCOSE: 128 MG/DL
GAMMA GLOB FLD ELPH-MCNC: 1 G/DL
GAMMA GLOB MFR SERPL ELPH: 13.6 %
HAV IGM SER QL: NONREACTIVE
HBA1C MFR BLD HPLC: 6.1 %
HBV CORE IGM SER QL: NONREACTIVE
HBV SURFACE AG SER QL: NONREACTIVE
HCV AB SER QL: NONREACTIVE
HCV S/CO RATIO: 0.12 S/CO
HIV1+2 AB SPEC QL IA.RAPID: NONREACTIVE
IGA SERPL-MCNC: 299 MG/DL
IGG SERPL-MCNC: 885 MG/DL
IGM SERPL-MCNC: 233 MG/DL
INR PPP: 1.14 RATIO
INTERPRETATION SERPL IEP-IMP: NORMAL
KAPPA LC CSF-MCNC: 1.95 MG/DL
KAPPA LC SERPL-MCNC: 2.7 MG/DL
M PROTEIN SPEC IFE-MCNC: NORMAL
PROT SERPL-MCNC: 7 G/DL
PROT SERPL-MCNC: 7 G/DL
PT BLD: 13.6 SEC

## 2025-05-14 ENCOUNTER — NON-APPOINTMENT (OUTPATIENT)
Age: 60
End: 2025-05-14

## 2025-05-14 DIAGNOSIS — R79.1 ABNORMAL COAGULATION PROFILE: ICD-10-CM

## 2025-05-31 ENCOUNTER — APPOINTMENT (OUTPATIENT)
Dept: CT IMAGING | Facility: CLINIC | Age: 60
End: 2025-05-31

## 2025-05-31 ENCOUNTER — OUTPATIENT (OUTPATIENT)
Dept: OUTPATIENT SERVICES | Facility: HOSPITAL | Age: 60
LOS: 1 days | End: 2025-05-31
Payer: COMMERCIAL

## 2025-05-31 DIAGNOSIS — R59.1 GENERALIZED ENLARGED LYMPH NODES: ICD-10-CM

## 2025-05-31 PROCEDURE — 74177 CT ABD & PELVIS W/CONTRAST: CPT | Mod: 26

## 2025-06-16 ENCOUNTER — RESULT REVIEW (OUTPATIENT)
Age: 60
End: 2025-06-16

## 2025-06-16 ENCOUNTER — APPOINTMENT (OUTPATIENT)
Dept: HEMATOLOGY ONCOLOGY | Facility: CLINIC | Age: 60
End: 2025-06-16

## 2025-06-16 LAB
BASOPHILS # BLD AUTO: 0.04 K/UL — SIGNIFICANT CHANGE UP (ref 0–0.2)
BASOPHILS NFR BLD AUTO: 0.6 % — SIGNIFICANT CHANGE UP (ref 0–2)
EOSINOPHIL # BLD AUTO: 0.25 K/UL — SIGNIFICANT CHANGE UP (ref 0–0.5)
EOSINOPHIL NFR BLD AUTO: 3.5 % — SIGNIFICANT CHANGE UP (ref 0–6)
HCT VFR BLD CALC: 36.4 % — SIGNIFICANT CHANGE UP (ref 34.5–45)
HGB BLD-MCNC: 11.7 G/DL — SIGNIFICANT CHANGE UP (ref 11.5–15.5)
IMM GRANULOCYTES # BLD AUTO: 0.01 K/UL — SIGNIFICANT CHANGE UP (ref 0–0.07)
IMM GRANULOCYTES NFR BLD AUTO: 0.1 % — SIGNIFICANT CHANGE UP (ref 0–0.9)
LYMPHOCYTES # BLD AUTO: 2.69 K/UL — SIGNIFICANT CHANGE UP (ref 1–3.3)
LYMPHOCYTES NFR BLD AUTO: 37.4 % — SIGNIFICANT CHANGE UP (ref 13–44)
MCHC RBC-ENTMCNC: 29 PG — SIGNIFICANT CHANGE UP (ref 27–34)
MCHC RBC-ENTMCNC: 32.1 G/DL — SIGNIFICANT CHANGE UP (ref 32–36)
MCV RBC AUTO: 90.3 FL — SIGNIFICANT CHANGE UP (ref 80–100)
MONOCYTES # BLD AUTO: 0.45 K/UL — SIGNIFICANT CHANGE UP (ref 0–0.9)
MONOCYTES NFR BLD AUTO: 6.3 % — SIGNIFICANT CHANGE UP (ref 2–14)
NEUTROPHILS # BLD AUTO: 3.76 K/UL — SIGNIFICANT CHANGE UP (ref 1.8–7.4)
NEUTROPHILS NFR BLD AUTO: 52.1 % — SIGNIFICANT CHANGE UP (ref 43–77)
NRBC # BLD AUTO: 0 K/UL — SIGNIFICANT CHANGE UP (ref 0–0)
NRBC # FLD: 0 K/UL — SIGNIFICANT CHANGE UP (ref 0–0)
NRBC BLD AUTO-RTO: 0 /100 WBCS — SIGNIFICANT CHANGE UP (ref 0–0)
PLATELET # BLD AUTO: 283 K/UL — SIGNIFICANT CHANGE UP (ref 150–400)
PMV BLD: 9.4 FL — SIGNIFICANT CHANGE UP (ref 7–13)
RBC # BLD: 4.03 M/UL — SIGNIFICANT CHANGE UP (ref 3.8–5.2)
RBC # FLD: 13.4 % — SIGNIFICANT CHANGE UP (ref 10.3–14.5)
WBC # BLD: 7.2 K/UL — SIGNIFICANT CHANGE UP (ref 3.8–10.5)
WBC # FLD AUTO: 7.2 K/UL — SIGNIFICANT CHANGE UP (ref 3.8–10.5)

## 2025-06-25 ENCOUNTER — APPOINTMENT (OUTPATIENT)
Dept: HEMATOLOGY ONCOLOGY | Facility: CLINIC | Age: 60
End: 2025-06-25
Payer: COMMERCIAL

## 2025-06-25 VITALS
DIASTOLIC BLOOD PRESSURE: 73 MMHG | SYSTOLIC BLOOD PRESSURE: 123 MMHG | OXYGEN SATURATION: 95 % | BODY MASS INDEX: 34.57 KG/M2 | HEIGHT: 65 IN | TEMPERATURE: 97.8 F | WEIGHT: 207.5 LBS | HEART RATE: 95 BPM

## 2025-06-25 PROCEDURE — 99214 OFFICE O/P EST MOD 30 MIN: CPT

## 2025-07-07 ENCOUNTER — APPOINTMENT (OUTPATIENT)
Dept: ORTHOPEDIC SURGERY | Facility: CLINIC | Age: 60
End: 2025-07-07

## 2025-07-18 ENCOUNTER — RX RENEWAL (OUTPATIENT)
Age: 60
End: 2025-07-18

## 2025-07-22 ENCOUNTER — APPOINTMENT (OUTPATIENT)
Dept: INTERNAL MEDICINE | Facility: CLINIC | Age: 60
End: 2025-07-22
Payer: COMMERCIAL

## 2025-07-22 VITALS
DIASTOLIC BLOOD PRESSURE: 74 MMHG | RESPIRATION RATE: 14 BRPM | BODY MASS INDEX: 35.32 KG/M2 | HEIGHT: 65 IN | SYSTOLIC BLOOD PRESSURE: 120 MMHG | WEIGHT: 212 LBS | TEMPERATURE: 97.6 F | HEART RATE: 86 BPM | OXYGEN SATURATION: 96 %

## 2025-07-22 DIAGNOSIS — I63.9 CEREBRAL INFARCTION, UNSPECIFIED: ICD-10-CM

## 2025-07-22 DIAGNOSIS — E11.69 TYPE 2 DIABETES MELLITUS WITH OTHER SPECIFIED COMPLICATION: ICD-10-CM

## 2025-07-22 DIAGNOSIS — E78.5 TYPE 2 DIABETES MELLITUS WITH OTHER SPECIFIED COMPLICATION: ICD-10-CM

## 2025-07-22 DIAGNOSIS — F32.A DEPRESSION, UNSPECIFIED: ICD-10-CM

## 2025-07-22 DIAGNOSIS — I10 ESSENTIAL (PRIMARY) HYPERTENSION: ICD-10-CM

## 2025-07-22 DIAGNOSIS — K82.4 CHOLESTEROLOSIS OF GALLBLADDER: ICD-10-CM

## 2025-07-22 DIAGNOSIS — K76.0 FATTY (CHANGE OF) LIVER, NOT ELSEWHERE CLASSIFIED: ICD-10-CM

## 2025-07-22 DIAGNOSIS — I65.22 OCCLUSION AND STENOSIS OF LEFT CAROTID ARTERY: ICD-10-CM

## 2025-07-22 DIAGNOSIS — R76.0 RAISED ANTIBODY TITER: ICD-10-CM

## 2025-07-22 DIAGNOSIS — M62.830 MUSCLE SPASM OF BACK: ICD-10-CM

## 2025-07-22 DIAGNOSIS — Z87.39 PERSONAL HISTORY OF OTHER DISEASES OF THE MUSCULOSKELETAL SYSTEM AND CONNECTIVE TISSUE: ICD-10-CM

## 2025-07-22 DIAGNOSIS — E11.9 TYPE 2 DIABETES MELLITUS W/OUT COMPLICATIONS: ICD-10-CM

## 2025-07-22 DIAGNOSIS — R20.2 PARESTHESIA OF SKIN: ICD-10-CM

## 2025-07-22 PROCEDURE — G2211 COMPLEX E/M VISIT ADD ON: CPT

## 2025-07-22 PROCEDURE — 99214 OFFICE O/P EST MOD 30 MIN: CPT

## 2025-09-17 DIAGNOSIS — I63.9 CEREBRAL INFARCTION, UNSPECIFIED: ICD-10-CM

## 2025-09-17 DIAGNOSIS — E78.1 PURE HYPERGLYCERIDEMIA: ICD-10-CM

## 2025-09-17 DIAGNOSIS — M75.101 UNSPECIFIED ROTATOR CUFF TEAR OR RUPTURE OF RIGHT SHOULDER, NOT SPECIFIED AS TRAUMATIC: ICD-10-CM

## 2025-09-17 DIAGNOSIS — F17.210 NICOTINE DEPENDENCE, CIGARETTES, UNCOMPLICATED: ICD-10-CM

## 2025-09-17 DIAGNOSIS — K76.0 FATTY (CHANGE OF) LIVER, NOT ELSEWHERE CLASSIFIED: ICD-10-CM

## 2025-09-17 DIAGNOSIS — M75.102 UNSPECIFIED ROTATOR CUFF TEAR OR RUPTURE OF LEFT SHOULDER, NOT SPECIFIED AS TRAUMATIC: ICD-10-CM

## 2025-09-17 DIAGNOSIS — E11.9 TYPE 2 DIABETES MELLITUS W/OUT COMPLICATIONS: ICD-10-CM

## 2025-09-17 DIAGNOSIS — R59.1 GENERALIZED ENLARGED LYMPH NODES: ICD-10-CM

## 2025-09-18 ENCOUNTER — APPOINTMENT (OUTPATIENT)
Dept: PULMONOLOGY | Facility: CLINIC | Age: 60
End: 2025-09-18
Payer: COMMERCIAL

## 2025-09-18 VITALS
DIASTOLIC BLOOD PRESSURE: 72 MMHG | SYSTOLIC BLOOD PRESSURE: 120 MMHG | OXYGEN SATURATION: 98 % | RESPIRATION RATE: 16 BRPM | HEART RATE: 82 BPM

## 2025-09-18 VITALS — BODY MASS INDEX: 35.82 KG/M2 | WEIGHT: 215 LBS | HEIGHT: 65 IN

## 2025-09-18 DIAGNOSIS — Z72.0 TOBACCO USE: ICD-10-CM

## 2025-09-18 DIAGNOSIS — Z86.39 PERSONAL HISTORY OF OTHER ENDOCRINE, NUTRITIONAL AND METABOLIC DISEASE: ICD-10-CM

## 2025-09-18 DIAGNOSIS — Z85.828 PERSONAL HISTORY OF OTHER MALIGNANT NEOPLASM OF SKIN: ICD-10-CM

## 2025-09-18 DIAGNOSIS — F17.200 NICOTINE DEPENDENCE, UNSPECIFIED, UNCOMPLICATED: ICD-10-CM

## 2025-09-18 DIAGNOSIS — R76.11 NONSPECIFIC REACTION TO TUBERCULIN SKIN TEST W/OUT ACTIVE TUBERCULOSIS: ICD-10-CM

## 2025-09-18 DIAGNOSIS — Z71.6 TOBACCO ABUSE COUNSELING: ICD-10-CM

## 2025-09-18 DIAGNOSIS — Z87.891 PERSONAL HISTORY OF NICOTINE DEPENDENCE: ICD-10-CM

## 2025-09-18 DIAGNOSIS — Z92.89 PERSONAL HISTORY OF OTHER MEDICAL TREATMENT: ICD-10-CM

## 2025-09-18 DIAGNOSIS — G47.33 OBSTRUCTIVE SLEEP APNEA (ADULT) (PEDIATRIC): ICD-10-CM

## 2025-09-18 DIAGNOSIS — Z78.9 OTHER SPECIFIED HEALTH STATUS: ICD-10-CM

## 2025-09-18 PROCEDURE — 99205 OFFICE O/P NEW HI 60 MIN: CPT | Mod: 25

## 2025-09-18 PROCEDURE — 99406 BEHAV CHNG SMOKING 3-10 MIN: CPT

## (undated) DEVICE — SNARE LESIONHUNTER ROTAT NITNL COLD 10MM

## (undated) DEVICE — FORCEP RADIAL JAW 4 JUMBO W NDL 2.8MM 3.2MM 240CM ORANGE DISP

## (undated) DEVICE — KIT DEFENDO 4 OLY 4 PC

## (undated) DEVICE — POLY TRAP ETRAP